# Patient Record
Sex: FEMALE | Race: WHITE | NOT HISPANIC OR LATINO | Employment: UNEMPLOYED | RURAL
[De-identification: names, ages, dates, MRNs, and addresses within clinical notes are randomized per-mention and may not be internally consistent; named-entity substitution may affect disease eponyms.]

---
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
## 2020-02-17 ENCOUNTER — INITIAL PRENATAL (OUTPATIENT)
Dept: OBSTETRICS AND GYNECOLOGY | Facility: CLINIC | Age: 20
End: 2020-02-17

## 2020-02-17 ENCOUNTER — APPOINTMENT (OUTPATIENT)
Dept: LAB | Facility: HOSPITAL | Age: 20
End: 2020-02-17

## 2020-02-17 VITALS — WEIGHT: 109 LBS | BODY MASS INDEX: 21.29 KG/M2 | DIASTOLIC BLOOD PRESSURE: 62 MMHG | SYSTOLIC BLOOD PRESSURE: 110 MMHG

## 2020-02-17 DIAGNOSIS — O34.219 HISTORY OF CESAREAN DELIVERY, CURRENTLY PREGNANT: ICD-10-CM

## 2020-02-17 DIAGNOSIS — Z32.00 PREGNANCY EXAMINATION OR TEST, PREGNANCY UNCONFIRMED: ICD-10-CM

## 2020-02-17 DIAGNOSIS — Z64.1 MULTIGRAVIDA: Primary | ICD-10-CM

## 2020-02-17 DIAGNOSIS — Z34.80 SUPERVISION OF OTHER NORMAL PREGNANCY: ICD-10-CM

## 2020-02-17 DIAGNOSIS — Z12.4 ENCOUNTER FOR PAPANICOLAOU SMEAR FOR CERVICAL CANCER SCREENING: ICD-10-CM

## 2020-02-17 DIAGNOSIS — Z34.90 PREGNANCY WITH FETUS OF UNKNOWN GESTATIONAL AGE: ICD-10-CM

## 2020-02-17 DIAGNOSIS — Z28.21 INFLUENZA VACCINE REFUSED: ICD-10-CM

## 2020-02-17 LAB
B-HCG UR QL: POSITIVE
INTERNAL NEGATIVE CONTROL: NEGATIVE
INTERNAL POSITIVE CONTROL: POSITIVE
Lab: ABNORMAL

## 2020-02-17 PROCEDURE — 80081 OBSTETRIC PANEL INC HIV TSTG: CPT | Performed by: NURSE PRACTITIONER

## 2020-02-17 PROCEDURE — 99214 OFFICE O/P EST MOD 30 MIN: CPT | Performed by: NURSE PRACTITIONER

## 2020-02-17 PROCEDURE — 80306 DRUG TEST PRSMV INSTRMNT: CPT | Performed by: NURSE PRACTITIONER

## 2020-02-17 PROCEDURE — 87591 N.GONORRHOEAE DNA AMP PROB: CPT | Performed by: NURSE PRACTITIONER

## 2020-02-17 PROCEDURE — 87661 TRICHOMONAS VAGINALIS AMPLIF: CPT | Performed by: NURSE PRACTITIONER

## 2020-02-17 PROCEDURE — 81001 URINALYSIS AUTO W/SCOPE: CPT | Performed by: NURSE PRACTITIONER

## 2020-02-17 PROCEDURE — 87147 CULTURE TYPE IMMUNOLOGIC: CPT | Performed by: NURSE PRACTITIONER

## 2020-02-17 PROCEDURE — 87624 HPV HI-RISK TYP POOLED RSLT: CPT | Performed by: NURSE PRACTITIONER

## 2020-02-17 PROCEDURE — 88141 CYTOPATH C/V INTERPRET: CPT | Performed by: PATHOLOGY

## 2020-02-17 PROCEDURE — 87088 URINE BACTERIA CULTURE: CPT | Performed by: NURSE PRACTITIONER

## 2020-02-17 PROCEDURE — 87491 CHLMYD TRACH DNA AMP PROBE: CPT | Performed by: NURSE PRACTITIONER

## 2020-02-17 PROCEDURE — 86803 HEPATITIS C AB TEST: CPT | Performed by: NURSE PRACTITIONER

## 2020-02-17 PROCEDURE — 81025 URINE PREGNANCY TEST: CPT | Performed by: NURSE PRACTITIONER

## 2020-02-17 PROCEDURE — G0123 SCREEN CERV/VAG THIN LAYER: HCPCS | Performed by: NURSE PRACTITIONER

## 2020-02-17 PROCEDURE — 87086 URINE CULTURE/COLONY COUNT: CPT | Performed by: NURSE PRACTITIONER

## 2020-02-17 RX ORDER — PRENATAL VIT NO.126/IRON/FOLIC 28MG-0.8MG
TABLET ORAL DAILY
COMMUNITY
End: 2020-09-17

## 2020-02-17 NOTE — PROGRESS NOTES
[facility]  Obstetrics Visit    CHIEF COMPLAINT:  New prenatal visit    HISTORY OF PRESENT ILLNESS:  Carol Guzmán is a 20 y.o. y/o  at Unknown by LMP (No LMP recorded (lmp unknown). Patient is pregnant.). Reports her periods have been different in the past 4 months, has had random spotting. This was a unplanned pregnancy and the patient is supported by Nasim JONES. Reports nausea, no vomiting. Reports breast tenderness. She denies any vaginal bleeding. She has started taking a prenatal vitamin.    REVIEW OF SYSTEMS  Review of Systems   Constitutional: Negative for chills, fatigue, fever, unexpected weight gain and unexpected weight loss.   HENT: Negative for sneezing and sore throat.    Respiratory: Negative for shortness of breath.    Cardiovascular: Negative for chest pain and palpitations.   Gastrointestinal: Positive for nausea. Negative for abdominal pain, constipation and diarrhea.   Genitourinary: Negative for breast discharge, breast lump, breast pain, difficulty urinating, dysuria, frequency, pelvic pain, pelvic pressure, urinary incontinence, vaginal bleeding, vaginal discharge and vaginal pain.   Skin: Negative for rash.   Neurological: Negative for weakness and headache.   Psychiatric/Behavioral: Negative for sleep disturbance, depressed mood and stress.       PRENATAL RISK FACTORS  Pregnancy Problems (from 19 to present)     Problem Noted Resolved    Multigravida 2020 by Fransisca Francois APRN No    History of  delivery, currently pregnant 2020 by Fransisca Francois APRN No    Overview Signed 2020  4:20 PM by Fransisca Francois APRN     Desires a repeat C/S         Influenza vaccine refused 2020 by Fransisca Francois APRN No          DATING CRITERIA:  LMP (unknown) -- MIKE  1TUS ( at ) -- MIKE-pending    OBSTETRIC HISTORY:  OB History    Para Term  AB Living   2 1 1     1   SAB TAB Ectopic Molar Multiple Live Births            0 1      # Outcome Date GA Lbr Patricio/2nd Weight Sex Delivery Anes PTL Lv   2 Current            1 Term 17 40w0d  2760 g (6 lb 1.4 oz) F CS-LTranv EPI N FRAN      Complications: Fetal Intolerance     GYN HISTORY:  Denies h/o sexually transmitted infections/pelvic inflammatory disease  Denies h/o abnormal pap smears  Last pap smear:    Last Completed Pap Smear       Status Date      PAP SMEAR No completions recorded        Denies h/o gynecologic surgeries, including biopsies of the cervix    PAST MEDICAL HISTORY:  Past Medical History:   Diagnosis Date   • Acute pharyngitis, unspecified    • Allergic    • Allergic rhinitis    • Asthma    • Common cold    • Cough    • Heart murmur    • History of ear infections    • Otitis media     Serous     • Upper respiratory infection    • Urinary tract infection      PAST SURGICAL HISTORY:  Past Surgical History:   Procedure Laterality Date   •  SECTION N/A 2017    Procedure:  SECTION PRIMARY;  Surgeon: Bird Munson MD;  Location: [facility] LABOR DELIVERY;  Service:      FAMILY HISTORY:  Family History   Problem Relation Age of Onset   • No Known Problems Father    • No Known Problems Mother    • No Known Problems Sister      SOCIAL HISTORY:  Social History     Socioeconomic History   • Marital status: Single     Spouse name: Not on file   • Number of children: Not on file   • Years of education: Not on file   • Highest education level: Not on file   Tobacco Use   • Smoking status: Never Smoker   • Smokeless tobacco: Never Used   Substance and Sexual Activity   • Alcohol use: No   • Drug use: Not Currently     Types: Marijuana   • Sexual activity: Yes     Partners: Male     Birth control/protection: None     GENETIC SCREENING:  Age >34 yo as of MIKE: No  Thalassemia: No  NTD: No  CHD: No  Down Syndrome/MR/Fragile X/Autism: No  Ashkenazi Mu-ism with Tha-Sachs, [place], familial dysautonomia: No  Sickle cell disease or trait: No  Hemophilia:  No  Muscular dystrophy: No  Cystic fibrosis: No  Elma's chorea: No  Birth defects: No  Genetic/chromosomal disorders: No    INFECTION HISTORY:  TB exposure: No  HSV: No  Illness since LMP: No  Prior GBS infected child: No  STIs: No    ALLERGIES:  Allergies   Allergen Reactions   • Percocet [Oxycodone-Acetaminophen] Hives     MEDICATIONS:  Prior to Admission medications    Medication Sig Start Date End Date Taking? Authorizing Provider   Prenatal Vit-Fe Fumarate-FA (PRENATAL, CLASSIC, VITAMIN) 28-0.8 MG tablet tablet Take  by mouth Daily.   Yes Provider, Historical, MD     PHYSICAL EXAM:   /62   Wt 49.4 kg (109 lb)   LMP  (LMP Unknown)   BMI 21.29 kg/m²   General: Alert, healthy, no distress, well nourished and well developed.  Neurologic: Alert, oriented to person, place, and time.  Gait normal.  Cranial nerves II-XII grossly intact.  HEENT: Normocephalic, atraumatic.  Extraocular muscles intact, pupils equal and reactive x2.    Teeth: Normal hygiene.  Neck: Supple, no adenopathy, thyroid normal size, non-tender, without nodularity, trachea midline.  Breasts: Deferred  Lungs: Normal respiratory effort.  Clear to auscultation bilaterally.  No wheezes, rhonci, or rales.  Heart: Regular rate and rhythm.  No murmer, rub or gallop.  Abdomen: Soft, non-tender, non-distended,no masses, no hepatosplenomegaly, no hernia.  Skin: No rash, no lesions.  Extremities: No cyanosis, clubbing or edema.  PELVIC EXAM:  External Genitalia/Vulva: Anatomy is normal, no significant redness of labia, no discharge on vulvar tissues, [place]'s and Bartholin's glands are normal, no ulcers, no condylomatous lesions.  Urethra: Normal, no lesions.  Vagina: Vaginal tissues are not inflamed, normal color and texture, no significant discharge present.  Cervix: Normal in appearance without lesions or purulent discharge, no cervical motion tenderness.  Uterus: Normal size, shape, and consistency.  Adnexa: Normal size and shape  bilaterally, no palpable mass bilaterally and non-tender bilaterally.  Pap smear obtained.    Bedside ultrasound performed by myself which shows the findings below: single intrauterine pregnancy, +cardiac movement via Vscan, FHR 170s via doppler      IMPRESSION:  Carol Guzmán is a 20 y.o.  at Unknown for a new prenatal visit.    PLAN:  1.  IUP at unknown EGA  - Options counseling performed and patient desires continuation of pregnancy to term   - Prenatal labs ordered  - Genetic testing including cystic fibrosis was discussed and patient desires NIPS  - Continue prenatal vitamins  - Weight gain counseling performed.   - BMI 18.5-24.9: 25-35 lb  - Return to clinic in 2 weeks for Dating U/S and  return prenatal visit     Diagnosis Plan   1. Multigravida     2. Pregnancy examination or test, pregnancy unconfirmed  POC Pregnancy, Urine   3. Supervision of other normal pregnancy  Chlamydia trachomatis, Neisseria gonorrhoeae, Trichomonas vaginalis, PCR - Urine, Urine, Clean Catch    OB Panel With HIV    Urine Culture - Urine, Urine, Clean Catch    Urine Drug Screen - Urine, Clean Catch    Urinalysis With Culture If Indicated -    RPR    CBC Auto Differential    Hepatitis B Surface Antigen    Rubella Antibody, IgG    OB Panel Type & Screen    HIV-1 / O / 2 Ag / Antibody 4th Generation    Hepatitis C Antibody    Hep B Confirmation Tube   4. Encounter for Papanicolaou smear for cervical cancer screening  Liquid-based Pap Smear, Screening   5. Pregnancy with fetus of unknown gestational age  US Ob < 14 Weeks Single or First Gestation   6. History of  delivery, currently pregnant     7. Influenza vaccine refused       Fransisca Connolly, APRN  2020  4:27 PM

## 2020-02-18 LAB
ABO GROUP BLD: NORMAL
AMPHET+METHAMPHET UR QL: NEGATIVE
AMPHETAMINES UR QL: NEGATIVE
BACTERIA UR QL AUTO: ABNORMAL /HPF
BARBITURATES UR QL SCN: NEGATIVE
BASOPHILS # BLD AUTO: 0.04 10*3/MM3 (ref 0–0.2)
BASOPHILS NFR BLD AUTO: 0.5 % (ref 0–1.5)
BENZODIAZ UR QL SCN: NEGATIVE
BILIRUB UR QL STRIP: NEGATIVE
BLD GP AB SCN SERPL QL: NEGATIVE
BUPRENORPHINE SERPL-MCNC: NEGATIVE NG/ML
CANNABINOIDS SERPL QL: POSITIVE
CLARITY UR: CLEAR
COCAINE UR QL: NEGATIVE
COLOR UR: YELLOW
DEPRECATED RDW RBC AUTO: 40.1 FL (ref 37–54)
EOSINOPHIL # BLD AUTO: 0.2 10*3/MM3 (ref 0–0.4)
EOSINOPHIL NFR BLD AUTO: 2.3 % (ref 0.3–6.2)
ERYTHROCYTE [DISTWIDTH] IN BLOOD BY AUTOMATED COUNT: 12.2 % (ref 12.3–15.4)
GLUCOSE UR STRIP-MCNC: NEGATIVE MG/DL
HBV SURFACE AG SERPL QL IA: NORMAL
HCT VFR BLD AUTO: 31.2 % (ref 34–46.6)
HCV AB SER DONR QL: NORMAL
HGB BLD-MCNC: 10.5 G/DL (ref 12–15.9)
HGB UR QL STRIP.AUTO: NEGATIVE
HIV1+2 AB SER QL: NORMAL
HOLD SPECIMEN: NORMAL
HYALINE CASTS UR QL AUTO: ABNORMAL /LPF
IMM GRANULOCYTES # BLD AUTO: 0.03 10*3/MM3 (ref 0–0.05)
IMM GRANULOCYTES NFR BLD AUTO: 0.3 % (ref 0–0.5)
KETONES UR QL STRIP: NEGATIVE
LEUKOCYTE ESTERASE UR QL STRIP.AUTO: ABNORMAL
LYMPHOCYTES # BLD AUTO: 1.91 10*3/MM3 (ref 0.7–3.1)
LYMPHOCYTES NFR BLD AUTO: 21.7 % (ref 19.6–45.3)
Lab: NORMAL
MCH RBC QN AUTO: 30.5 PG (ref 26.6–33)
MCHC RBC AUTO-ENTMCNC: 33.7 G/DL (ref 31.5–35.7)
MCV RBC AUTO: 90.7 FL (ref 79–97)
METHADONE UR QL SCN: NEGATIVE
MONOCYTES # BLD AUTO: 0.48 10*3/MM3 (ref 0.1–0.9)
MONOCYTES NFR BLD AUTO: 5.5 % (ref 5–12)
NEUTROPHILS # BLD AUTO: 6.13 10*3/MM3 (ref 1.7–7)
NEUTROPHILS NFR BLD AUTO: 69.7 % (ref 42.7–76)
NITRITE UR QL STRIP: NEGATIVE
NRBC BLD AUTO-RTO: 0 /100 WBC (ref 0–0.2)
OPIATES UR QL: NEGATIVE
OXYCODONE UR QL SCN: NEGATIVE
PCP UR QL SCN: NEGATIVE
PH UR STRIP.AUTO: 6.5 [PH] (ref 5–8)
PLATELET # BLD AUTO: 283 10*3/MM3 (ref 140–450)
PMV BLD AUTO: 10.3 FL (ref 6–12)
PROPOXYPH UR QL: NEGATIVE
PROT UR QL STRIP: ABNORMAL
RBC # BLD AUTO: 3.44 10*6/MM3 (ref 3.77–5.28)
RBC # UR: ABNORMAL /HPF
REF LAB TEST METHOD: ABNORMAL
RH BLD: POSITIVE
RPR SER QL: NORMAL
RUBV IGG SERPL IA-ACNC: POSITIVE
SP GR UR STRIP: 1.01 (ref 1–1.03)
SQUAMOUS #/AREA URNS HPF: ABNORMAL /HPF
TRICYCLICS UR QL SCN: NEGATIVE
UROBILINOGEN UR QL STRIP: ABNORMAL
WBC NRBC COR # BLD: 8.79 10*3/MM3 (ref 3.4–10.8)
WBC UR QL AUTO: ABNORMAL /HPF

## 2020-02-19 LAB
BACTERIA SPEC AEROBE CULT: ABNORMAL
C TRACH RRNA CVX QL NAA+PROBE: NEGATIVE
N GONORRHOEA RRNA SPEC QL NAA+PROBE: NEGATIVE
STREP GROUPING: ABNORMAL
STREP GROUPING: ABNORMAL
TRICHOMONAS VAGINALIS PCR: NEGATIVE

## 2020-02-20 RX ORDER — FERROUS SULFATE 325(65) MG
325 TABLET ORAL EVERY OTHER DAY
Qty: 30 TABLET | Refills: 0 | Status: SHIPPED | OUTPATIENT
Start: 2020-02-20 | End: 2020-05-28 | Stop reason: SDUPTHER

## 2020-02-21 LAB
GEN CATEG CVX/VAG CYTO-IMP: ABNORMAL
LAB AP CASE REPORT: ABNORMAL
LAB AP GYN ADDITIONAL INFORMATION: ABNORMAL
LAB AP GYN OTHER FINDINGS: ABNORMAL
PATH INTERP SPEC-IMP: ABNORMAL
STAT OF ADQ CVX/VAG CYTO-IMP: ABNORMAL

## 2020-02-24 LAB — HPV I/H RISK 4 DNA CVX QL PROBE+SIG AMP: POSITIVE

## 2020-03-02 ENCOUNTER — ROUTINE PRENATAL (OUTPATIENT)
Dept: OBSTETRICS AND GYNECOLOGY | Facility: CLINIC | Age: 20
End: 2020-03-02

## 2020-03-02 VITALS — WEIGHT: 108 LBS | SYSTOLIC BLOOD PRESSURE: 92 MMHG | DIASTOLIC BLOOD PRESSURE: 60 MMHG | BODY MASS INDEX: 21.09 KG/M2

## 2020-03-02 DIAGNOSIS — R87.810 ASCUS WITH POSITIVE HIGH RISK HPV CERVICAL: ICD-10-CM

## 2020-03-02 DIAGNOSIS — Z3A.12 12 WEEKS GESTATION OF PREGNANCY: ICD-10-CM

## 2020-03-02 DIAGNOSIS — R82.5 POSITIVE URINE DRUG SCREEN: ICD-10-CM

## 2020-03-02 DIAGNOSIS — R87.610 ASCUS WITH POSITIVE HIGH RISK HPV CERVICAL: ICD-10-CM

## 2020-03-02 DIAGNOSIS — O99.012 ANEMIA IN PREGNANCY, SECOND TRIMESTER: ICD-10-CM

## 2020-03-02 DIAGNOSIS — O99.322 DRUG USE AFFECTING PREGNANCY IN SECOND TRIMESTER: Primary | ICD-10-CM

## 2020-03-02 PROCEDURE — 99213 OFFICE O/P EST LOW 20 MIN: CPT | Performed by: NURSE PRACTITIONER

## 2020-03-02 NOTE — PROGRESS NOTES
CC: Prenatal visit    Carol Guzmán is a 20 y.o.  at 12w0d.  Doing well.  No complaints.  Denies contractions, LOF, or VB.  Reports good FM. Reviewed prenatal labs; pt is anemic. Reports she has been taking her iron every other day. +UDS, she reports she takes THC gummies to help with her appetite.     BP 92/60   Wt 49 kg (108 lb)   LMP  (LMP Unknown)   BMI 21.09 kg/m²   SVE: Deferred     Fetal Heart Rate: 163 us    Pregnancy Problems (from 20 to present)     Problem Noted Resolved    Positive urine drug screen 3/2/2020 by Fransisca Francois APRN No    Overview Signed 3/2/2020  2:13 PM by Fransisca Francois APRN     +THC         ASCUS with positive high risk HPV cervical 3/2/2020 by Fransisca Francois APRN No    Overview Signed 3/2/2020  3:28 PM by Fransisca Francois APRN     Repeat in 12 months.          Anemia in pregnancy, second trimester 3/2/2020 by Fransisca Francois APRN No    Overview Signed 3/2/2020  3:32 PM by Fransisca Francois APRN     Ferrous sulfate supplementation         Multigravida 2020 by Fransisca Francois APRN No    Overview Signed 3/2/2020  2:19 PM by Fransisca Francois APRN      A pos / Rubella Immune / GBS @ 35-36 wks  Dating: by CRL  Flu: Declined  Genetics: Declined  Anatomy: 20 weeks  Glucola: 28 wks  Tdap: 28 wks  Lab Results   Component Value Date    WBC 8.79 2020    HGB 10.5 (L) 2020    HCT 31.2 (L) 2020    MCV 90.7 2020     2020              History of  delivery, currently pregnant 2020 by Fransisca Francois APRN No    Overview Signed 2020  4:20 PM by Fransisca Francois APRN     Desires a repeat C/S         Influenza vaccine refused 2020 by Fransisca Francois APRN No          A/P: Carol Guzmán is a 20 y.o.  at 12w0d by CRL. Dating US today demonstrates a single intrauterine pregnancy,  bpm with an MIKE 2020.   - Inform pt of PAP result at next apt.  - RTC in 4  weeks     Diagnosis Plan   1. Drug use affecting pregnancy in second trimester  Counseled on cessation of THC gummies   2. Positive urine drug screen     3. 12 weeks gestation of pregnancy     4. ASCUS with positive high risk HPV cervical     5. Anemia in pregnancy, second trimester       Fransisca Connolly, APRN  3/2/2020  3:35 PM

## 2020-03-11 DIAGNOSIS — Z34.90 PREGNANCY WITH FETUS OF UNKNOWN GESTATIONAL AGE: ICD-10-CM

## 2020-03-30 ENCOUNTER — ROUTINE PRENATAL (OUTPATIENT)
Dept: OBSTETRICS AND GYNECOLOGY | Facility: CLINIC | Age: 20
End: 2020-03-30

## 2020-03-30 VITALS — BODY MASS INDEX: 21.09 KG/M2 | DIASTOLIC BLOOD PRESSURE: 58 MMHG | SYSTOLIC BLOOD PRESSURE: 98 MMHG | WEIGHT: 108 LBS

## 2020-03-30 DIAGNOSIS — Z3A.16 16 WEEKS GESTATION OF PREGNANCY: ICD-10-CM

## 2020-03-30 DIAGNOSIS — O99.322 DRUG USE AFFECTING PREGNANCY IN SECOND TRIMESTER: Primary | ICD-10-CM

## 2020-03-30 DIAGNOSIS — F12.11 MILD TETRAHYDROCANNABINOL (THC) ABUSE IN EARLY REMISSION: ICD-10-CM

## 2020-03-30 DIAGNOSIS — O34.211 ENCOUNTER FOR MATERNAL CARE FOR LOW TRANSVERSE SCAR FROM PREVIOUS CESAREAN DELIVERY: ICD-10-CM

## 2020-03-30 DIAGNOSIS — O99.012 ANEMIA IN PREGNANCY, SECOND TRIMESTER: ICD-10-CM

## 2020-03-30 DIAGNOSIS — Z36.3 ENCOUNTER FOR ANTENATAL SCREENING FOR MALFORMATION USING ULTRASOUND: ICD-10-CM

## 2020-03-30 DIAGNOSIS — Z34.82 SUPERVISION OF NORMAL INTRAUTERINE PREGNANCY IN MULTIGRAVIDA IN SECOND TRIMESTER: ICD-10-CM

## 2020-03-30 PROCEDURE — 99214 OFFICE O/P EST MOD 30 MIN: CPT | Performed by: NURSE PRACTITIONER

## 2020-03-30 NOTE — PROGRESS NOTES
CC: Prenatal visit; hx reviewed and updated.     Carol Guzmán is a 20 y.o.  at 16w0d.  Doing well.  No complaints.  Denies contractions, LOF, or VB.  Denies dysuria, abnormal vaginal d/c, headaches, heartburn or constipation. States that she is no longer using THC gummies and her N/V has resolved.    BP 98/58   Wt 49 kg (108 lb)   LMP  (LMP Unknown)   BMI 21.09 kg/m²   SVE: NA      Fetal Heart Rate: 160    Pregnancy Problems (from 20 to present)     Problem Noted Resolved    Drug use affecting pregnancy in second trimester 3/2/2020 by Fransisca Francois APRN No    Overview Signed 3/2/2020  2:13 PM by Fransisca Francois APRN     +THC         Anemia in pregnancy, second trimester 3/2/2020 by Fransisca Francois APRN No    Overview Signed 3/2/2020  3:32 PM by Fransisca Francois APRN     Ferrous sulfate supplementation         Supervision of normal intrauterine pregnancy in multigravida in second trimester 2020 by Fransisca Francois APRN No    Overview Signed 3/2/2020  2:19 PM by Fransisca Francois APRN      A pos / Rubella Immune / GBS @ 35-36 wks  Dating: by CRL  Flu: Declined  Genetics: Declined  Anatomy: 20 weeks  Glucola: 28 wks  Tdap: 28 wks  Lab Results   Component Value Date    WBC 8.79 2020    HGB 10.5 (L) 2020    HCT 31.2 (L) 2020    MCV 90.7 2020     2020              Encounter for maternal care for low transverse scar from previous  delivery 2020 by Fransisca Francois APRN No    Overview Addendum 3/30/2020  2:21 PM by Juliane Mcrae APRN     x1 for fetal intolerance  Desires a repeat C/S               A/P: Carol Guzmán is a 20 y.o.  at 16w0d.  - RTC in 4 weeks for anatomy scan and ILANA visit     Diagnosis Plan   1. Drug use affecting pregnancy in second trimester  Reports cessation of THC  Will need UDS next visit at time of anatomy scan   2. Anemia in pregnancy, second trimester  US Ob 14 + Weeks Single or  First Gestation   3. Supervision of normal intrauterine pregnancy in multigravida in second trimester     4. Encounter for  screening for malformation using ultrasound  US Ob 14 + Weeks Single or First Gestation   5. Mild tetrahydrocannabinol (THC) abuse in early remission     6. Encounter for maternal care for low transverse scar from previous  delivery     7. 16 weeks gestation of pregnancy       Juliane Mcrae, DAX  3/30/2020  14:22

## 2020-04-22 DIAGNOSIS — Z36.3 ENCOUNTER FOR ANTENATAL SCREENING FOR MALFORMATION USING ULTRASOUND: ICD-10-CM

## 2020-04-22 DIAGNOSIS — O99.012 ANEMIA IN PREGNANCY, SECOND TRIMESTER: ICD-10-CM

## 2020-04-29 ENCOUNTER — ROUTINE PRENATAL (OUTPATIENT)
Dept: OBSTETRICS AND GYNECOLOGY | Facility: CLINIC | Age: 20
End: 2020-04-29

## 2020-04-29 VITALS — WEIGHT: 119 LBS | SYSTOLIC BLOOD PRESSURE: 120 MMHG | BODY MASS INDEX: 23.24 KG/M2 | DIASTOLIC BLOOD PRESSURE: 52 MMHG

## 2020-04-29 DIAGNOSIS — Z36.89 ENCOUNTER FOR OTHER SPECIFIED ANTENATAL SCREENING: ICD-10-CM

## 2020-04-29 DIAGNOSIS — O34.211 ENCOUNTER FOR MATERNAL CARE FOR LOW TRANSVERSE SCAR FROM PREVIOUS CESAREAN DELIVERY: ICD-10-CM

## 2020-04-29 DIAGNOSIS — Z3A.20 20 WEEKS GESTATION OF PREGNANCY: ICD-10-CM

## 2020-04-29 DIAGNOSIS — Z34.82 SUPERVISION OF NORMAL INTRAUTERINE PREGNANCY IN MULTIGRAVIDA IN SECOND TRIMESTER: ICD-10-CM

## 2020-04-29 DIAGNOSIS — O99.322 DRUG USE AFFECTING PREGNANCY IN SECOND TRIMESTER: Primary | ICD-10-CM

## 2020-04-29 DIAGNOSIS — O99.012 ANEMIA IN PREGNANCY, SECOND TRIMESTER: ICD-10-CM

## 2020-04-29 PROCEDURE — 99213 OFFICE O/P EST LOW 20 MIN: CPT | Performed by: NURSE PRACTITIONER

## 2020-04-29 NOTE — PROGRESS NOTES
CC: Prenatal visit; hx reviewed, no changes.     Carol Guzmán is a 20 y.o.  at 20w2d.  Doing well.  Denies dysuria, vb, LOF, abnormal vaginal d/c, headaches, heartburn or constipation.     /52   Wt 54 kg (119 lb)   LMP  (LMP Unknown)   BMI 23.24 kg/m²   SVE: NA    Anatomy u/s reviewed. All anatomy seen and noted to appear normal. U/S done on . Anterior placenta w/o previa, with normal insertion of a 3VC. CL- WNL.  Fundal Height (cm): 20 cm  Fetal Heart Rate: 140    Pregnancy Problems (from 20 to present)     Problem Noted Resolved    Drug use affecting pregnancy in second trimester 3/2/2020 by Fransisca Francois APRN No    Overview Signed 3/2/2020  2:13 PM by Fransisca Francois APRN     +THC         Anemia in pregnancy, second trimester 3/2/2020 by Fransisca Francois APRN No    Overview Signed 3/2/2020  3:32 PM by Fransisca Francois APRN     Ferrous sulfate supplementation         Supervision of normal intrauterine pregnancy in multigravida in second trimester 2020 by Fransisca Francois APRN No    Overview Signed 3/2/2020  2:19 PM by Fransisca Francois APRN      A pos / Rubella Immune / GBS @ 35-36 wks  Dating: by CRL  Flu: Declined  Genetics: Declined  Anatomy: 20 weeks  Glucola: 28 wks  Tdap: 28 wks  Lab Results   Component Value Date    WBC 8.79 2020    HGB 10.5 (L) 2020    HCT 31.2 (L) 2020    MCV 90.7 2020     2020              Encounter for maternal care for low transverse scar from previous  delivery 2020 by Fransisca Francois APRN No    Overview Addendum 3/30/2020  2:21 PM by Juliane Mcrae APRN     x1 for fetal intolerance  Desires a repeat C/S               A/P: Carol Guzmán is a 20 y.o.  at 20w2d.  - RTC in 4 weeks  - Reviewed COVID-19 visitation policy  - Reviewed COVID-19 precautions     Diagnosis Plan   1. Encounter for other specified  screening  CBC (No Diff)    Glucose, Post 50 Gm  Glucola    Urine Drug Screen - Urine, Clean Catch   2. Drug use affecting pregnancy in second trimester  Urine Drug Screen - Urine, Clean Catch   3. Anemia in pregnancy, second trimester     4. Supervision of normal intrauterine pregnancy in multigravida in second trimester     5. Encounter for maternal care for low transverse scar from previous  delivery       Juliane Mcrae, DAX  2020  16:12

## 2020-05-28 ENCOUNTER — APPOINTMENT (OUTPATIENT)
Dept: LAB | Facility: HOSPITAL | Age: 20
End: 2020-05-28

## 2020-05-28 ENCOUNTER — LAB (OUTPATIENT)
Dept: LAB | Facility: HOSPITAL | Age: 20
End: 2020-05-28

## 2020-05-28 ENCOUNTER — ROUTINE PRENATAL (OUTPATIENT)
Dept: OBSTETRICS AND GYNECOLOGY | Facility: CLINIC | Age: 20
End: 2020-05-28

## 2020-05-28 VITALS — BODY MASS INDEX: 25.39 KG/M2 | SYSTOLIC BLOOD PRESSURE: 112 MMHG | WEIGHT: 130 LBS | DIASTOLIC BLOOD PRESSURE: 58 MMHG

## 2020-05-28 DIAGNOSIS — O99.012 ANEMIA IN PREGNANCY, SECOND TRIMESTER: ICD-10-CM

## 2020-05-28 DIAGNOSIS — O99.322 DRUG USE AFFECTING PREGNANCY IN SECOND TRIMESTER: Primary | ICD-10-CM

## 2020-05-28 DIAGNOSIS — O99.322 DRUG USE AFFECTING PREGNANCY IN SECOND TRIMESTER: ICD-10-CM

## 2020-05-28 DIAGNOSIS — O23.42 URINARY TRACT INFECTION IN MOTHER DURING SECOND TRIMESTER OF PREGNANCY: ICD-10-CM

## 2020-05-28 DIAGNOSIS — N13.30 HYDRONEPHROSIS DETERMINED BY ULTRASOUND: ICD-10-CM

## 2020-05-28 DIAGNOSIS — Z3A.24 24 WEEKS GESTATION OF PREGNANCY: ICD-10-CM

## 2020-05-28 DIAGNOSIS — Z34.82 SUPERVISION OF NORMAL INTRAUTERINE PREGNANCY IN MULTIGRAVIDA IN SECOND TRIMESTER: ICD-10-CM

## 2020-05-28 DIAGNOSIS — O34.211 ENCOUNTER FOR MATERNAL CARE FOR LOW TRANSVERSE SCAR FROM PREVIOUS CESAREAN DELIVERY: ICD-10-CM

## 2020-05-28 DIAGNOSIS — Z36.89 ENCOUNTER FOR OTHER SPECIFIED ANTENATAL SCREENING: ICD-10-CM

## 2020-05-28 LAB
AMPHET+METHAMPHET UR QL: NEGATIVE
AMPHETAMINES UR QL: NEGATIVE
BARBITURATES UR QL SCN: NEGATIVE
BENZODIAZ UR QL SCN: NEGATIVE
BUPRENORPHINE SERPL-MCNC: NEGATIVE NG/ML
CANNABINOIDS SERPL QL: NEGATIVE
COCAINE UR QL: NEGATIVE
DEPRECATED RDW RBC AUTO: 42.2 FL (ref 37–54)
ERYTHROCYTE [DISTWIDTH] IN BLOOD BY AUTOMATED COUNT: 12.8 % (ref 12.3–15.4)
GLUCOSE 1H P 100 G GLC PO SERPL-MCNC: 187 MG/DL (ref 60–140)
HCT VFR BLD AUTO: 20.9 % (ref 34–46.6)
HGB BLD-MCNC: 7.2 G/DL (ref 12–15.9)
MCH RBC QN AUTO: 31.7 PG (ref 26.6–33)
MCHC RBC AUTO-ENTMCNC: 34.4 G/DL (ref 31.5–35.7)
MCV RBC AUTO: 92.1 FL (ref 79–97)
METHADONE UR QL SCN: NEGATIVE
OPIATES UR QL: NEGATIVE
OXYCODONE UR QL SCN: NEGATIVE
PCP UR QL SCN: NEGATIVE
PLATELET # BLD AUTO: 198 10*3/MM3 (ref 140–450)
PMV BLD AUTO: 9.3 FL (ref 6–12)
PROPOXYPH UR QL: NEGATIVE
RBC # BLD AUTO: 2.27 10*6/MM3 (ref 3.77–5.28)
TRICYCLICS UR QL SCN: NEGATIVE
WBC NRBC COR # BLD: 7.6 10*3/MM3 (ref 3.4–10.8)

## 2020-05-28 PROCEDURE — 80306 DRUG TEST PRSMV INSTRMNT: CPT

## 2020-05-28 PROCEDURE — 82950 GLUCOSE TEST: CPT

## 2020-05-28 PROCEDURE — 99214 OFFICE O/P EST MOD 30 MIN: CPT | Performed by: NURSE PRACTITIONER

## 2020-05-28 PROCEDURE — 36415 COLL VENOUS BLD VENIPUNCTURE: CPT

## 2020-05-28 PROCEDURE — 85027 COMPLETE CBC AUTOMATED: CPT

## 2020-05-28 RX ORDER — DOCUSATE SODIUM 100 MG/1
100 CAPSULE, LIQUID FILLED ORAL DAILY
COMMUNITY
Start: 2020-05-28 | End: 2020-09-17

## 2020-05-28 RX ORDER — CEPHALEXIN 500 MG/1
500 CAPSULE ORAL 4 TIMES DAILY
COMMUNITY
Start: 2020-05-28 | End: 2020-06-18

## 2020-05-28 RX ORDER — FERROUS SULFATE 325(65) MG
325 TABLET ORAL 2 TIMES DAILY
COMMUNITY
Start: 2020-05-28 | End: 2020-09-17

## 2020-05-28 NOTE — PROGRESS NOTES
CC: Prenatal visit; hx reviewed and updated. Pt was just d/c'd from [facility] L&D this morning. She was being treated for dehydration, hydronephrosis and severe anemia.    Carol Guzmán is a 20 y.o.  at 24w3d. Denies N/V, contractions, LOF, or VB.  Reports good FM. Was having severe flank pain bilaterally 3 nights ago and was instructed by this APRN on-call to go to the ER at [facility]. She instead went to [facility] and was there for the past 3 days getting IV fluids and antibiotics. She notes today that her pain has nearly subsided and she's just feeling fatigued now. Records from [facility] reviewed and sent to medical records.    /58   Wt 59 kg (130 lb)   LMP  (LMP Unknown)   BMI 25.39 kg/m²   SVE: NA  Fundal Height (cm): 25 cm  Fetal Heart Rate: 166    Pregnancy Problems (from 20 to present)     Problem Noted Resolved    Urinary tract infection in mother during second trimester of pregnancy 2020 by Juliane Mcrae APRN No    Overview Signed 2020  3:39 PM by Juliane Mcrae APRN     Needs GOYO in 2 weeks         Hydronephrosis determined by ultrasound 2020 by Juliane Mcrae APRN No    Overview Signed 2020  3:40 PM by Juliane Mcrae APRN     Dx at [facility] on . Pain is improving          Drug use affecting pregnancy in second trimester 3/2/2020 by Fransisca Francois APRN No    Overview Signed 3/2/2020  2:13 PM by Fransisca Francois APRN     +THC         Anemia in pregnancy, second trimester 3/2/2020 by Fransisca Francois APRN No    Overview Signed 3/2/2020  3:32 PM by Fransisca Francois APRN     Ferrous sulfate supplementation         Supervision of normal intrauterine pregnancy in multigravida in second trimester 2020 by Fransisca Francois APRN No    Overview Signed 3/2/2020  2:19 PM by Fransisca Francois APRN      A pos / Rubella Immune / GBS @ 35-36 wks  Dating: by CRL  Flu: Declined  Genetics: Declined  Anatomy: 20 weeks  Glucola: 28 wks  Tdap: 28 wks  Lab Results    Component Value Date    WBC 8.79 2020    HGB 10.5 (L) 2020    HCT 31.2 (L) 2020    MCV 90.7 2020     2020              Encounter for maternal care for low transverse scar from previous  delivery 2020 by Fransisca Francois APRN No    Overview Addendum 3/30/2020  2:21 PM by Juliane Mcrae APRN     x1 for fetal intolerance  Desires a repeat C/S               A/P: Carol Guzmán is a 20 y.o.  at 24w3d.  - RTC in 2 weeks for ILANA visit and will need GOYO for UTI at that time.  - Reviewed COVID-19 visitation policy  - Reviewed COVID-19 precautions     Diagnosis Plan   1. Drug use affecting pregnancy in second trimester  UDS today   2. Anemia in pregnancy, second trimester  CBC today   3. Supervision of normal intrauterine pregnancy in multigravida in second trimester  Glucola today   4. Encounter for maternal care for low transverse scar from previous  delivery     5. Hydronephrosis determined by ultrasound     6. Urinary tract infection in mother during second trimester of pregnancy  Advised to take all antibiotics prescribed and will plan to recheck urinalysis in 2 weeks at f/u   7. 24 weeks gestation of pregnancy       DAX [place]  2020  15:41

## 2020-05-29 ENCOUNTER — TELEPHONE (OUTPATIENT)
Dept: OBSTETRICS AND GYNECOLOGY | Facility: CLINIC | Age: 20
End: 2020-05-29

## 2020-05-29 ENCOUNTER — CONSULT (OUTPATIENT)
Dept: ONCOLOGY | Facility: CLINIC | Age: 20
End: 2020-05-29

## 2020-05-29 ENCOUNTER — INFUSION (OUTPATIENT)
Dept: ONCOLOGY | Facility: HOSPITAL | Age: 20
End: 2020-05-29

## 2020-05-29 ENCOUNTER — LAB (OUTPATIENT)
Dept: ONCOLOGY | Facility: HOSPITAL | Age: 20
End: 2020-05-29

## 2020-05-29 VITALS
BODY MASS INDEX: 24.71 KG/M2 | SYSTOLIC BLOOD PRESSURE: 126 MMHG | HEART RATE: 97 BPM | DIASTOLIC BLOOD PRESSURE: 62 MMHG | WEIGHT: 126.5 LBS | TEMPERATURE: 97.3 F | RESPIRATION RATE: 18 BRPM

## 2020-05-29 DIAGNOSIS — O99.012 ANEMIA DURING PREGNANCY IN SECOND TRIMESTER: Primary | ICD-10-CM

## 2020-05-29 DIAGNOSIS — O99.012 ANEMIA IN PREGNANCY, SECOND TRIMESTER: ICD-10-CM

## 2020-05-29 DIAGNOSIS — O99.012 ANEMIA IN PREGNANCY, SECOND TRIMESTER: Primary | ICD-10-CM

## 2020-05-29 DIAGNOSIS — D50.9 IRON DEFICIENCY ANEMIA, UNSPECIFIED IRON DEFICIENCY ANEMIA TYPE: ICD-10-CM

## 2020-05-29 DIAGNOSIS — E53.8 B12 DEFICIENCY: ICD-10-CM

## 2020-05-29 LAB
FERRITIN SERPL-MCNC: 48.88 NG/ML (ref 13–150)
FOLATE SERPL-MCNC: 12.6 NG/ML (ref 4.78–24.2)
IRON 24H UR-MRATE: 130 MCG/DL (ref 37–145)
IRON SATN MFR SERPL: 38 % (ref 20–50)
PHOSPHATE SERPL-MCNC: 3.5 MG/DL (ref 2.5–4.5)
TIBC SERPL-MCNC: 343 MCG/DL (ref 298–536)
TRANSFERRIN SERPL-MCNC: 230 MG/DL (ref 200–360)
VIT B12 BLD-MCNC: 235 PG/ML (ref 211–946)

## 2020-05-29 PROCEDURE — 25010000002 FERRIC CARBOXYMALTOSE 750 MG/15ML SOLUTION 15 ML VIAL: Performed by: INTERNAL MEDICINE

## 2020-05-29 PROCEDURE — 36415 COLL VENOUS BLD VENIPUNCTURE: CPT | Performed by: INTERNAL MEDICINE

## 2020-05-29 PROCEDURE — 83540 ASSAY OF IRON: CPT

## 2020-05-29 PROCEDURE — 96374 THER/PROPH/DIAG INJ IV PUSH: CPT | Performed by: INTERNAL MEDICINE

## 2020-05-29 PROCEDURE — 84466 ASSAY OF TRANSFERRIN: CPT

## 2020-05-29 PROCEDURE — 82746 ASSAY OF FOLIC ACID SERUM: CPT

## 2020-05-29 PROCEDURE — 99204 OFFICE O/P NEW MOD 45 MIN: CPT | Performed by: INTERNAL MEDICINE

## 2020-05-29 PROCEDURE — 82728 ASSAY OF FERRITIN: CPT

## 2020-05-29 PROCEDURE — 84100 ASSAY OF PHOSPHORUS: CPT

## 2020-05-29 PROCEDURE — 82607 VITAMIN B-12: CPT

## 2020-05-29 RX ORDER — METHYLPREDNISOLONE SODIUM SUCCINATE 125 MG/2ML
125 INJECTION, POWDER, LYOPHILIZED, FOR SOLUTION INTRAMUSCULAR; INTRAVENOUS AS NEEDED
Status: CANCELLED | OUTPATIENT
Start: 2020-06-05

## 2020-05-29 RX ORDER — DIPHENHYDRAMINE HYDROCHLORIDE 50 MG/ML
50 INJECTION INTRAMUSCULAR; INTRAVENOUS AS NEEDED
Status: DISCONTINUED | OUTPATIENT
Start: 2020-05-29 | End: 2020-05-29 | Stop reason: HOSPADM

## 2020-05-29 RX ORDER — SODIUM CHLORIDE 9 MG/ML
250 INJECTION, SOLUTION INTRAVENOUS ONCE
Status: CANCELLED | OUTPATIENT
Start: 2020-06-05

## 2020-05-29 RX ORDER — DIPHENHYDRAMINE HYDROCHLORIDE 50 MG/ML
50 INJECTION INTRAMUSCULAR; INTRAVENOUS AS NEEDED
Status: CANCELLED | OUTPATIENT
Start: 2020-06-05

## 2020-05-29 RX ORDER — SODIUM CHLORIDE 9 MG/ML
250 INJECTION, SOLUTION INTRAVENOUS ONCE
Status: COMPLETED | OUTPATIENT
Start: 2020-05-29 | End: 2020-05-29

## 2020-05-29 RX ORDER — METHYLPREDNISOLONE SODIUM SUCCINATE 125 MG/2ML
125 INJECTION, POWDER, LYOPHILIZED, FOR SOLUTION INTRAMUSCULAR; INTRAVENOUS AS NEEDED
Status: DISCONTINUED | OUTPATIENT
Start: 2020-05-29 | End: 2020-05-29 | Stop reason: HOSPADM

## 2020-05-29 RX ADMIN — SODIUM CHLORIDE 250 ML: 9 INJECTION, SOLUTION INTRAVENOUS at 13:02

## 2020-05-29 RX ADMIN — FERRIC CARBOXYMALTOSE INJECTION 750 MG: 50 INJECTION, SOLUTION INTRAVENOUS at 13:02

## 2020-05-29 NOTE — TELEPHONE ENCOUNTER
Consulted with Dr. Massey. Pt needs STAT referral to hematology. Discussed with pt. States that she is asymptomatic at this time. Spoke to Kristy in Hematology; pt has appt today at 11:45. Pt notified.

## 2020-05-30 RX ORDER — FOLIC ACID 1 MG/1
1 TABLET ORAL DAILY
Qty: 90 TABLET | Refills: 3 | Status: SHIPPED | OUTPATIENT
Start: 2020-05-30 | End: 2020-09-17

## 2020-05-31 PROBLEM — E53.8 B12 DEFICIENCY: Status: ACTIVE | Noted: 2020-05-31

## 2020-05-31 PROBLEM — D50.9 IRON DEFICIENCY ANEMIA: Status: ACTIVE | Noted: 2020-05-31

## 2020-05-31 NOTE — PROGRESS NOTES
Carol Guzmán  [id number]  2000      REASON FOR CONSULTATION:  Anemia in pregnancy   Provide an opinion on any further workup or treatment                             REQUESTING PHYSICIAN:  Juliane Mcrae     RECORDS OBTAINED:  Records of the patients history including those obtained from the referring provider were reviewed and summarized in detail.      History of Present Illness     This is a pleasant 20 year old female who was seen in consultation at the request of Juliane Mcrae for evaluation of severe anemia during pregnancy. Patient denies any prior history of health problems. She was undergoing pre-twyla exam and was noticed to have severe anemia with Hg of 7.2. She reports many symptoms related to severe anemia - fatigue, lethargy, weakness, palpitations. She is not taking oral iron supplements as she is unable to tolerate oral iron. She denies any blood loss. No BRBPR or melena. She did have issue with anemia during her first pregnancy;however, did not receive blood transfusions or IV iron treatment. Denies any family history of hematological disorder. I have been asked with assistance of severe anemia.     Past Medical History:   Diagnosis Date   • Acute pharyngitis, unspecified    • Allergic    • Allergic rhinitis    • Anemia in pregnancy, second trimester 3/2/2020   • Asthma    • Common cold    • Cough    • Heart murmur    • History of ear infections    • Mild tetrahydrocannabinol (THC) abuse in early remission 3/30/2020   • Otitis media     Serous     • Upper respiratory infection    • Urinary tract infection         Past Surgical History:   Procedure Laterality Date   •  SECTION N/A 2017    Procedure:  SECTION PRIMARY;  Surgeon: Bird Munson MD;  Location: [facility] LABOR DELIVERY;  Service:         Current Outpatient Medications on File Prior to Visit   Medication Sig Dispense Refill   • cephalexin (KEFLEX) 500 MG capsule Take 500 mg by mouth 4 (Four)  Times a Day.     • docusate sodium (COLACE) 100 MG capsule Take 100 mg by mouth Daily.     • ferrous sulfate 325 (65 FE) MG tablet Take 325 mg by mouth 2 (Two) Times a Day.     • Prenatal Vit-Fe Fumarate-FA (PRENATAL, CLASSIC, VITAMIN) 28-0.8 MG tablet tablet Take  by mouth Daily.       No current facility-administered medications on file prior to visit.         ALLERGIES:    Allergies   Allergen Reactions   • Percocet [Oxycodone-Acetaminophen] Hives        Social History     Socioeconomic History   • Marital status: Single     Spouse name: Not on file   • Number of children: Not on file   • Years of education: Not on file   • Highest education level: Not on file   Tobacco Use   • Smoking status: Never Smoker   • Smokeless tobacco: Never Used   Substance and Sexual Activity   • Alcohol use: No   • Drug use: Not Currently     Types: Marijuana   • Sexual activity: Yes     Partners: Male     Birth control/protection: None        Family History   Problem Relation Age of Onset   • No Known Problems Father    • No Known Problems Mother    • No Known Problems Sister         Review of Systems       CONSTITUTIONAL: fatigue + weakness + No weight loss, fever, chills.  HEENT: Eyes: No visual loss, blurred vision, double vision or yellow sclerae. Ears, Nose, Throat: No hearing loss, sneezing, congestion, runny nose or sore throat.  SKIN: No rash or itching.  CARDIOVASCULAR: palpitations + No chest pain, chest pressure or chest discomfort. No  edema.  RESPIRATORY: No shortness of breath, cough or sputum.  GASTROINTESTINAL: No anorexia, nausea, vomiting or diarrhea. No abdominal pain or blood.  GENITOURINARY: Negative for urgency, frequency or dysuria.   NEUROLOGICAL: No headache, dizziness, syncope, paralysis, ataxia, numbness or tingling in the extremities. No change in bowel or bladder control.  MUSCULOSKELETAL: No muscle, back pain, joint pain or stiffness.  HEMATOLOGIC: No anemia, bleeding or bruising.  LYMPHATICS: No  enlarged nodes. No history of splenectomy.  PSYCHIATRIC: No history of depression or anxiety.  ENDOCRINOLOGIC: No reports of sweating, cold or heat intolerance. No polyuria or polydipsia.  ALLERGIES: No history of asthma, hives, eczema or rhinitis.      Objective     Vitals:    05/29/20 1124   BP: 126/62   Pulse: 97   Resp: 18   Temp: 97.3 °F (36.3 °C)   Weight: 57.4 kg (126 lb 8 oz)   PainSc: 0-No pain     Current Status 5/29/2020   ECOG score 0       Physical Exam      GENERAL: Alert, awake, oriented.  Well dressed.  Not in apparent distress. Vitals as above.   HEAD: Normocephalic, atraumatic.   EYES: PERRL, EOMI.  vision is grossly intact. Conjunctival pallor +   NECK: Supple, no adenopathy or thyromegaly.   THROAT: Normal oral cavity and pharynx. No inflammation, swelling, exudate, or lesions.  CARDIAC: Normal S1 and S2. No S3, S4 or murmurs. Rhythm is regular.  Extremities are warm and well perfused.   LUNGS: Clear to auscultation and percussion without rales, rhonchi, wheezing or diminished breath sounds.  ABDOMEN: Positive bowel sounds. Soft, pregnant + , nontender. No guarding or rebound. No masses.  BACK:  No bony tenderness.   EXTREMITIES: No significant deformity or joint abnormality.  Peripheral pulses intact. No varicosities.  SKIN: No rash or bruising.  NEUROLOGICAL: Grossly non-focal exam. No focal weakness. Gait: Normal.   PSYCH: Mood and affect normal. No hallucination or suicidal thoughts.   LYMPHATIC: No cervical, supraclavicular or axillary adenopathy.       RECENT LABS:Independently reviewed and summarized.   Hematology WBC   Date Value Ref Range Status   05/28/2020 7.60 3.40 - 10.80 10*3/mm3 Final     RBC   Date Value Ref Range Status   05/28/2020 2.27 (L) 3.77 - 5.28 10*6/mm3 Final     Hemoglobin   Date Value Ref Range Status   05/28/2020 7.2 (L) 12.0 - 15.9 g/dL Final     Hematocrit   Date Value Ref Range Status   05/28/2020 20.9 (C) 34.0 - 46.6 % Final     Platelets   Date Value Ref Range  Status   05/28/2020 198 140 - 450 10*3/mm3 Final      I have reviewed old records and summarized them in HPI as well as assessment and plan section of this note.       Carol Guzmán reports a pain score of 0.  Given her pain assessment as noted, treatment options were discussed and the following options were decided upon as a follow-up plan to address the patient's pain: continuation of current treatment plan for pain.       Administer PHQ and document follow-up plan if positive   Total Score: 0   Depression screen negative.     Diagnosis:   (1) Anemia in pregnancy, second trimester   (2) Iron deficiency anemia   (3) B12 deficiency     All are new diagnosis/problems for me.     Assessment/Plan     Patient with severe anemia during second trimester of pregnancy.   We checked her iron level, b12 and folate level.   Her anemia is likely multifactorial - dilutional, iron deficiency and b12 deficiency.   She is unable to tolerate oral iron.   Due to this reason I believe she could benefit from IV iron.     I had an extensive discussion with patient about diagnosis and treatment options. I recommend that we replaced her iron with IV injectofer 750 mg, 2 infusions, one week apart. I also recommend that we should check  iron studies every 3 months after this initial replacement and consider IV iron treatment for ferritin drops to less than 50 or transference saturation dropped to less than 20%.     I had an extensive discussion with her about risk versus benefits of IV iron treatment.    I discussed about various risks associated with IV iron such as allergic reaction, hypersensitivity reaction, headache, flushing, joint aches or pains, local IV infiltration and skin discoloration.  After our discussion patient was in agreement and proceeding with IV iron treatment for  Anemia.    In addition, given her B12 level is borderline low, she will need IM b12 injection.   I will ask my nurse to teach her and her family how  to do B12 injection at home.   I will arrange for daily b12 injection daily for 7 days, then weekly for 4 weeks and then monthly b12 for duration of pregnancy.     I have also sent prescription of folic acid 1 mg to her pharmacy.     Recommendations:   - IV injectofer 750 mg IV x 2 infusions, 1 week apart.   - IM b12 injections as above.   - Daily folic acid 1 mg po daily.     Prescriptions sent today: IM cobalamine, folic acid.   IV iron infusion arranged.       Thank you for involving me in Ms Guzmán's care.     Please call us if any questions/concerns.     Navi Murphy MD   Hematology Oncology

## 2020-06-01 ENCOUNTER — TELEPHONE (OUTPATIENT)
Dept: ONCOLOGY | Facility: HOSPITAL | Age: 20
End: 2020-06-01

## 2020-06-01 NOTE — TELEPHONE ENCOUNTER
Informed patient of information. Gave date/time of all appointments. She verbalized understanding.

## 2020-06-01 NOTE — TELEPHONE ENCOUNTER
----- Message from Susan Murphy MD sent at 5/30/2020 10:33 AM CDT -----  b12 is low. Please teach patient/family how to do b12 injections. Prescriptions sent to her pharmacy.   7 daily b12 shots, followed by 4 weekly followed by monthly for remainder of pregnancy.   She will also need to take folic acid daily.   Prescriptions sent to her pharmacy.

## 2020-06-02 ENCOUNTER — TELEPHONE (OUTPATIENT)
Dept: OBSTETRICS AND GYNECOLOGY | Facility: CLINIC | Age: 20
End: 2020-06-02

## 2020-06-04 ENCOUNTER — TELEPHONE (OUTPATIENT)
Dept: OBSTETRICS AND GYNECOLOGY | Facility: CLINIC | Age: 20
End: 2020-06-04

## 2020-06-04 DIAGNOSIS — O99.810 ABNORMAL GLUCOSE TOLERANCE TEST (GTT) DURING PREGNANCY, ANTEPARTUM: Primary | ICD-10-CM

## 2020-06-05 ENCOUNTER — TELEPHONE (OUTPATIENT)
Dept: ONCOLOGY | Facility: HOSPITAL | Age: 20
End: 2020-06-05

## 2020-06-05 ENCOUNTER — INFUSION (OUTPATIENT)
Dept: ONCOLOGY | Facility: HOSPITAL | Age: 20
End: 2020-06-05

## 2020-06-05 VITALS
HEART RATE: 92 BPM | DIASTOLIC BLOOD PRESSURE: 60 MMHG | SYSTOLIC BLOOD PRESSURE: 128 MMHG | TEMPERATURE: 98.1 F | RESPIRATION RATE: 18 BRPM

## 2020-06-05 DIAGNOSIS — O99.012 ANEMIA IN PREGNANCY, SECOND TRIMESTER: Primary | ICD-10-CM

## 2020-06-05 PROCEDURE — 96374 THER/PROPH/DIAG INJ IV PUSH: CPT | Performed by: INTERNAL MEDICINE

## 2020-06-05 PROCEDURE — 25010000002 FERRIC CARBOXYMALTOSE 750 MG/15ML SOLUTION 15 ML VIAL: Performed by: INTERNAL MEDICINE

## 2020-06-05 RX ORDER — METHYLPREDNISOLONE SODIUM SUCCINATE 125 MG/2ML
125 INJECTION, POWDER, LYOPHILIZED, FOR SOLUTION INTRAMUSCULAR; INTRAVENOUS AS NEEDED
Status: DISCONTINUED | OUTPATIENT
Start: 2020-06-05 | End: 2020-06-05

## 2020-06-05 RX ORDER — METHYLPREDNISOLONE SODIUM SUCCINATE 125 MG/2ML
125 INJECTION, POWDER, LYOPHILIZED, FOR SOLUTION INTRAMUSCULAR; INTRAVENOUS AS NEEDED
Status: CANCELLED | OUTPATIENT
Start: 2020-06-05

## 2020-06-05 RX ORDER — DIPHENHYDRAMINE HYDROCHLORIDE 50 MG/ML
50 INJECTION INTRAMUSCULAR; INTRAVENOUS AS NEEDED
Status: DISCONTINUED | OUTPATIENT
Start: 2020-06-05 | End: 2020-06-05

## 2020-06-05 RX ORDER — SODIUM CHLORIDE 9 MG/ML
250 INJECTION, SOLUTION INTRAVENOUS ONCE
Status: COMPLETED | OUTPATIENT
Start: 2020-06-05 | End: 2020-06-05

## 2020-06-05 RX ORDER — SODIUM CHLORIDE 9 MG/ML
250 INJECTION, SOLUTION INTRAVENOUS ONCE
Status: CANCELLED | OUTPATIENT
Start: 2020-06-05

## 2020-06-05 RX ORDER — DIPHENHYDRAMINE HYDROCHLORIDE 50 MG/ML
50 INJECTION INTRAMUSCULAR; INTRAVENOUS AS NEEDED
Status: CANCELLED | OUTPATIENT
Start: 2020-06-05

## 2020-06-05 RX ADMIN — FERRIC CARBOXYMALTOSE INJECTION 750 MG: 50 INJECTION, SOLUTION INTRAVENOUS at 13:22

## 2020-06-05 RX ADMIN — SODIUM CHLORIDE 250 ML: 9 INJECTION, SOLUTION INTRAVENOUS at 13:22

## 2020-06-05 NOTE — PROGRESS NOTES
Pt given education and instructions on how to give IM b12 injection. Pt voiced understanding.   Oksana Young RN  June 5, 2020  13:43

## 2020-06-05 NOTE — PATIENT INSTRUCTIONS
Intramuscular Injection Instructions Using a Syringe and Vial, Adult  An intramuscular (IM) injection is a shot of medicine that is given into a muscle. An IM injection may be given when medicine needs to start working right away, or when medicine cannot be given any other way. You will use a single-use syringe to give the IM injection. The medicine comes in a bottle (vial). You will fill the syringe with medicine from the vial.  Before your first injection, your health care provider will show you or your caregiver how to do the injection at home. Use only the syringe, needle, and medicine that your health care provider prescribes. Use each syringe and needle only one time.  What are the risks?  With proper training, this is a safe procedure. However, problems may occur. Common problems include pain and bruising at the injection site. Other problems may include:  · Bleeding.  · Nerve injury.  · Infection.  · The medicine going into the blood stream (intravenous administration).  · Muscle injury from the injections.  · Allergic reaction to the medicine.  Supplies needed:  · Medication guide or package insert that came with the medicine prescribed by your health care provider.  ? Follow directions from the guide about how to prepare and give the injection. This is important because the directions may be different for each medicine.  · Medicine prescribed by your health care provider.  · Syringe.  · Needle. Use the needle length and size (gauge) that your health care provider or pharmacist gives to you. Do not reuse needles. Use each needle only once.  · Alcohol wipes.  · Bandage.  · A container for syringe disposal.  ? This may be a puncture-proof sharps container or a hard plastic container that has a secure lid, such as an empty laundry detergent bottle.  How to choose a site for injection  Follow instructions from your health care provider about where to give the injection. Generally:  · The hip is the preferred  "injection site.  · The injection may also be given in the upper arm or outer thigh, depending on the amount of medicine in the injection and your muscle mass.  Do not inject the medicine in the same spot each time.  Do not inject over areas of skin that are open, infected, or bruised.  How to locate an injection site  Refer to the following instructions to find the exact location of each injection site.  Outer thigh    1. Imagine that the thigh is divided into three equal sections (thirds) between the knee and the hip.  2. The injection site area is in the middle third, on the outer side of the thigh.  Upper arm    1. Find the bony point at the top of the arm (acromion process). Place two fingers just under the acromion process.  2. Under the two fingers, picture an upside-down triangle over the arm muscle. The injection site is in the middle of the triangle.  Hip    1. Place the palm of your hand on the outside of the hip so your wrist is at the top of the upper leg.  2. Your thumb should point toward the groin.  3. Use your ring finger and little finger to feel the upper edge of the pelvic bone.  4. Spread your index finger and middle finger apart from each other into a \"V\" shape.  5. The injection site is the area between those fingers.  Preparing to give an IM injection  1. Check the medicine and syringe for any damage. If you notice any of the following, do not give the medicine and contact your pharmacy or health care provider.  ? Cracked glass vial.  ? Cloudy medicine.  ? Clumps or solids floating in the medicine.  2. Get in a comfortable position so that the muscle site is relaxed. This depends on the site of the injection.  How to give an IM injection using medicine from a vial  1. Wash your hands with soap and water. If soap and water are not available, use hand .  2. Use an alcohol wipe to clean the site where you will be injecting the needle. Let the site air-dry.  3. Gently roll the medicine " vial between your hands to mix it. Do not shake the vial.  4. If there is a plastic covering on the vial, remove it. Clean the top rubber part of the vial with an alcohol wipe.  5. Remove the plastic cover from the needle on the syringe. Do not let the needle touch anything.  6. Pull the plunger back to draw air into the syringe. Stop the plunger when the dose indicator gets to the line that is the same as your medicine dose.  7. Push the needle through the rubber on the top of the vial. Do not turn the vial over.  8. Push the plunger in all the way. This pushes air into the vial.  9. While the needle is still in the vial, turn the vial upside down and hold it at eye level.  10. Pull back slowly on the plunger to draw medicine into the syringe. Stop when the dose indicator is at the correct amount of medicine needed.  11. Remove the needle from the vial. Do not let the needle touch anything.  12. Hold the syringe with the needle pointing up. Check the syringe for any remaining air bubbles. If there are air bubbles, flick the syringe with your finger until the air bubbles rise to the top. Then, gently push on the plunger until you can see a drop of medicine appear at the tip of the needle. This will clear any remaining air bubbles from the syringe.  13. Hold the syringe in your writing hand like a pencil.  14. Insert the entire needle straight into the injection site. The needle should be at a 90-degree angle (perpendicular) to the skin. Push the needle all the way through the skin and into the muscle. Continue to hold the syringe with your writing hand.  ? If instructed, use your other hand to pull back slightly on the plunger (aspirate) to see if any blood enters the syringe. If there is blood, do not inject the medicine. Remove the needle and start over with a new needle and syringe. Most medicines will not require that you do this step.  15. Use the thumb of your writing hand to push the plunger until the syringe  is empty. If the medicine is a gel, you may need to push harder on the plunger to press the medicine out of the syringe and into the muscle.  16. Pull the needle straight out of the skin.  17. Press and hold the alcohol wipe over the injection site until bleeding stops. Do not rub the area.  18. Cover the injection site with a bandage, if needed.  19. Write down the date, time, and location of each injection that you give. This is important.  20. If more than one injection is needed, give an injection at least 1 inch (2.5 cm) away from the previous injection site.  How to safely throw away the supplies  If you are using a syringe that does not have a safety system for shielding the needle after injection:  · Do not recap the needle. Place the syringe and needle in the disposal container.  If your syringe has a safety system for shielding the needle after injection:  · Firmly push down on the plunger after you complete the injection. The protective sleeve will automatically cover the needle, and you will hear a click. The click means that the needle is safely covered.  Follow the disposal regulations for the area where you live. Do not use any syringe or needle more than one time. You may throw the empty medicine vial in the trash.  Contact a health care provider if:  · You have difficulty giving the injection.  · You think that the injection was not given correctly.  · You have difficulty with any of the supplies.  · The medicine causes side effects.  · Rashes develop on the skin.  · A fever develops.  · The condition that is being treated gets worse.  Get help right away if:  · Any of these symptoms develop after the injection is given:  ? Difficulty breathing.  ? Chest pain.  ? A rash over most or all of the body.  ? Swelling of the lips or tongue.  ? Difficulty swallowing.  Summary  · An intramuscular (IM) injection is a shot of medicine that is given into a muscle.  · Use only the syringe, needle, and medicine  that your health care provider prescribes. Use each syringe and needle only one time.  · Follow instructions from your health care provider about where to give an injection.  This information is not intended to replace advice given to you by your health care provider. Make sure you discuss any questions you have with your health care provider.  Document Released: 04/03/2012 Document Revised: 04/24/2019 Document Reviewed: 04/24/2019  Elsevier Patient Education © 2020 [employer]

## 2020-06-05 NOTE — TELEPHONE ENCOUNTER
----- Message from Oksana Young RN sent at 6/5/2020  1:25 PM CDT -----  Regarding: b12 syringes  Pt was not given any syringes for her b12. She states that she will need that called in to her pharmacy.

## 2020-06-12 ENCOUNTER — LAB (OUTPATIENT)
Dept: LAB | Facility: HOSPITAL | Age: 20
End: 2020-06-12

## 2020-06-12 DIAGNOSIS — O99.810 ABNORMAL GLUCOSE TOLERANCE TEST (GTT) DURING PREGNANCY, ANTEPARTUM: ICD-10-CM

## 2020-06-12 LAB
GLUCOSE 1H P 100 G GLC PO SERPL-MCNC: 113 MG/DL (ref 74–180)
GLUCOSE 2H P 100 G GLC PO SERPL-MCNC: 89 MG/DL (ref 74–155)
GLUCOSE 3H P 100 G GLC PO SERPL-MCNC: 68 MG/DL (ref 74–140)
GLUCOSE P FAST SERPL-MCNC: 83 MG/DL (ref 65–99)

## 2020-06-12 PROCEDURE — 82952 GTT-ADDED SAMPLES: CPT

## 2020-06-12 PROCEDURE — 36415 COLL VENOUS BLD VENIPUNCTURE: CPT

## 2020-06-12 PROCEDURE — 82951 GLUCOSE TOLERANCE TEST (GTT): CPT

## 2020-06-18 ENCOUNTER — ROUTINE PRENATAL (OUTPATIENT)
Dept: OBSTETRICS AND GYNECOLOGY | Facility: CLINIC | Age: 20
End: 2020-06-18

## 2020-06-18 VITALS — WEIGHT: 130 LBS | SYSTOLIC BLOOD PRESSURE: 105 MMHG | BODY MASS INDEX: 25.39 KG/M2 | DIASTOLIC BLOOD PRESSURE: 63 MMHG

## 2020-06-18 DIAGNOSIS — O99.322 DRUG USE AFFECTING PREGNANCY IN SECOND TRIMESTER: ICD-10-CM

## 2020-06-18 DIAGNOSIS — O23.42 URINARY TRACT INFECTION IN MOTHER DURING SECOND TRIMESTER OF PREGNANCY: ICD-10-CM

## 2020-06-18 DIAGNOSIS — O99.012 ANEMIA IN PREGNANCY, SECOND TRIMESTER: ICD-10-CM

## 2020-06-18 DIAGNOSIS — O34.211 ENCOUNTER FOR MATERNAL CARE FOR LOW TRANSVERSE SCAR FROM PREVIOUS CESAREAN DELIVERY: ICD-10-CM

## 2020-06-18 DIAGNOSIS — Z34.82 SUPERVISION OF NORMAL INTRAUTERINE PREGNANCY IN MULTIGRAVIDA IN SECOND TRIMESTER: ICD-10-CM

## 2020-06-18 DIAGNOSIS — N13.30 HYDRONEPHROSIS DETERMINED BY ULTRASOUND: ICD-10-CM

## 2020-06-18 DIAGNOSIS — Z3A.27 27 WEEKS GESTATION OF PREGNANCY: Primary | ICD-10-CM

## 2020-06-18 PROCEDURE — 99213 OFFICE O/P EST LOW 20 MIN: CPT | Performed by: OBSTETRICS & GYNECOLOGY

## 2020-06-18 NOTE — PROGRESS NOTES
CC: Prenatal visit    Carol Guzmán is a 20 y.o.  at 27w3d.  Doing well.  Denies contractions, LOF, or VB.  Reports good FM.  Reviewed 3-hour glucose test which was normal    /63   Wt 59 kg (130 lb)   LMP  (LMP Unknown)   BMI 25.39 kg/m²     Fundal Height (cm): 27 cm  Fetal Heart Rate: 140    Pregnancy Problems (from 19 to present)     Problem Noted Resolved    Urinary tract infection in mother during second trimester of pregnancy 2020 by Juliane Mcrae APRN No    Overview Signed 2020  3:39 PM by Juliane Mcrae APRN     Needs GOYO in 2 weeks         Hydronephrosis determined by ultrasound 2020 by Juliane Mcrae APRN No    Overview Signed 2020  3:40 PM by Juliane Mcrae APRN     Dx at [facility] on . Pain is improving          Drug use affecting pregnancy in second trimester 3/2/2020 by Fransisca Francois APRN No    Overview Signed 3/2/2020  2:13 PM by Fransisca Francois APRN     +THC         Anemia in pregnancy, second trimester 3/2/2020 by Fransisca Francois APRN No    Overview Signed 3/2/2020  3:32 PM by Fransisca Francois APRN     Ferrous sulfate supplementation         Supervision of normal intrauterine pregnancy in multigravida in second trimester 2020 by Fransisca Francois APRN No    Overview Signed 3/2/2020  2:19 PM by Fransisca Francois APRN      A pos / Rubella Immune / GBS @ 35-36 wks  Dating: by CRL  Flu: Declined  Genetics: Declined  Anatomy: 20 weeks  Glucola: 28 wks  Tdap: 28 wks  Lab Results   Component Value Date    WBC 8.79 2020    HGB 10.5 (L) 2020    HCT 31.2 (L) 2020    MCV 90.7 2020     2020              Encounter for maternal care for low transverse scar from previous  delivery 2020 by Fransisca Francois APRN No    Overview Addendum 3/30/2020  2:21 PM by Cook, Juliane W, APRN     x1 for fetal intolerance  Desires a repeat C/S               A/P: Carol Guzmán is a 20 y.o.   at 27w3d.  Doing well appropriately progressing pregnancy no further kidney problems at this time.  Return to see me in 2 weeks, sooner as needed.  Fetal kick count  labor precautions given.  Patient now desires repeat low transverse  section.  - RTC in 2 weeks  - Reviewed COVID-19 visitation policy  - Reviewed COVID-19 precautions     Diagnosis Plan   1. 27 weeks gestation of pregnancy     2. Urinary tract infection in mother during second trimester of pregnancy     3. Hydronephrosis determined by ultrasound     4. Drug use affecting pregnancy in second trimester     5. Anemia in pregnancy, second trimester     6. Supervision of normal intrauterine pregnancy in multigravida in second trimester     7. Encounter for maternal care for low transverse scar from previous  delivery       Juan Chen DO  2020  11:00

## 2020-07-02 ENCOUNTER — ROUTINE PRENATAL (OUTPATIENT)
Dept: OBSTETRICS AND GYNECOLOGY | Facility: CLINIC | Age: 20
End: 2020-07-02

## 2020-07-02 VITALS — SYSTOLIC BLOOD PRESSURE: 118 MMHG | WEIGHT: 136 LBS | BODY MASS INDEX: 26.56 KG/M2 | DIASTOLIC BLOOD PRESSURE: 58 MMHG

## 2020-07-02 DIAGNOSIS — O99.012 ANEMIA IN PREGNANCY, SECOND TRIMESTER: ICD-10-CM

## 2020-07-02 DIAGNOSIS — R87.810 ASCUS WITH POSITIVE HIGH RISK HPV CERVICAL: ICD-10-CM

## 2020-07-02 DIAGNOSIS — O34.211 ENCOUNTER FOR MATERNAL CARE FOR LOW TRANSVERSE SCAR FROM PREVIOUS CESAREAN DELIVERY: ICD-10-CM

## 2020-07-02 DIAGNOSIS — O99.322 DRUG USE AFFECTING PREGNANCY IN SECOND TRIMESTER: ICD-10-CM

## 2020-07-02 DIAGNOSIS — R87.610 ASCUS WITH POSITIVE HIGH RISK HPV CERVICAL: ICD-10-CM

## 2020-07-02 DIAGNOSIS — O23.42 URINARY TRACT INFECTION IN MOTHER DURING SECOND TRIMESTER OF PREGNANCY: ICD-10-CM

## 2020-07-02 DIAGNOSIS — Z34.82 SUPERVISION OF NORMAL INTRAUTERINE PREGNANCY IN MULTIGRAVIDA IN SECOND TRIMESTER: ICD-10-CM

## 2020-07-02 DIAGNOSIS — O36.5931 INTRAUTERINE GROWTH RESTRICTION (IUGR) AFFECTING CARE OF MOTHER, THIRD TRIMESTER, FETUS 1: ICD-10-CM

## 2020-07-02 DIAGNOSIS — Z3A.29 29 WEEKS GESTATION OF PREGNANCY: Primary | ICD-10-CM

## 2020-07-02 PROCEDURE — 90471 IMMUNIZATION ADMIN: CPT | Performed by: OBSTETRICS & GYNECOLOGY

## 2020-07-02 PROCEDURE — 90715 TDAP VACCINE 7 YRS/> IM: CPT | Performed by: OBSTETRICS & GYNECOLOGY

## 2020-07-02 PROCEDURE — 99213 OFFICE O/P EST LOW 20 MIN: CPT | Performed by: OBSTETRICS & GYNECOLOGY

## 2020-07-03 RX ORDER — NITROFURANTOIN 25; 75 MG/1; MG/1
100 CAPSULE ORAL DAILY
Qty: 30 CAPSULE | Refills: 2 | Status: SHIPPED | OUTPATIENT
Start: 2020-07-03 | End: 2020-08-02

## 2020-07-03 NOTE — PROGRESS NOTES
CC: Prenatal visit    Carol Guzmán is a 20 y.o.  at 29w4d.  Doing well.  Denies contractions, LOF, or VB.  Reports good FM.  Plan to start patient on daily Macrobid for pyelonephritis.     /58   Wt 61.7 kg (136 lb)   LMP  (LMP Unknown)   BMI 26.56 kg/m²     Fundal Height (cm): 27 cm  Fetal Heart Rate: 140    Pregnancy Problems (from 20 to present)     Problem Noted Resolved    Urinary tract infection in mother during second trimester of pregnancy 2020 by Juliane Mcrae APRN No    Overview Signed 2020  3:39 PM by Juliane Mcrae APRN     Needs GOYO in 2 weeks         Hydronephrosis determined by ultrasound 2020 by Juliane Mcrae APRN No    Overview Signed 2020  3:40 PM by Juliane Mcrae APRN     Dx at [facility] on . Pain is improving          Drug use affecting pregnancy in second trimester 3/2/2020 by Fransisca Francois APRN No    Overview Signed 3/2/2020  2:13 PM by Fransisca Francois APRN     +THC         Anemia in pregnancy, second trimester 3/2/2020 by Fransisca Francois APRN No    Overview Signed 3/2/2020  3:32 PM by Fransisca Francois APRN     Ferrous sulfate supplementation         Supervision of normal intrauterine pregnancy in multigravida in second trimester 2020 by Fransisca Francois APRN No    Overview Signed 3/2/2020  2:19 PM by Fransisca Francois APRN      A pos / Rubella Immune / GBS @ 35-36 wks  Dating: by CRL  Flu: Declined  Genetics: Declined  Anatomy: 20 weeks  Glucola: 28 wks  Tdap: 28 wks  Lab Results   Component Value Date    WBC 8.79 2020    HGB 10.5 (L) 2020    HCT 31.2 (L) 2020    MCV 90.7 2020     2020              Encounter for maternal care for low transverse scar from previous  delivery 2020 by Fransisca Francois APRN No    Overview Addendum 3/30/2020  2:21 PM by Juliane Mcrae APRN     x1 for fetal intolerance  Desires a repeat C/S               A/P: Carol Guzmán  is a 20 y.o.  at 29w4d.  Patient with size less than dates will get growth ultrasound in 2 weeks.  Plan to have patient start on Macrobid daily for pyelonephritis earlier in the pregnancy.  Bleeding and cramping precautions given  labor and fetal kick count precautions given.  Patient to return sooner as needed.  - RTC in 2 weeks  - Reviewed COVID-19 visitation policy  - Reviewed COVID-19 precautions     Diagnosis Plan   1. 29 weeks gestation of pregnancy  Tdap Vaccine Greater Than or Equal To 6yo IM   2. Intrauterine growth restriction (IUGR) affecting care of mother, third trimester, fetus 1  US Ob Follow Up Transabdominal Approach   3. Supervision of normal intrauterine pregnancy in multigravida in second trimester     4. Encounter for maternal care for low transverse scar from previous  delivery     5. Drug use affecting pregnancy in second trimester     6. ASCUS with positive high risk HPV cervical     7. Anemia in pregnancy, second trimester     8. Urinary tract infection in mother during second trimester of pregnancy       Juan Chen DO  7/3/2020  16:20

## 2020-07-08 DIAGNOSIS — O99.012 ANEMIA IN PREGNANCY, SECOND TRIMESTER: Primary | ICD-10-CM

## 2020-07-10 ENCOUNTER — LAB (OUTPATIENT)
Dept: ONCOLOGY | Facility: HOSPITAL | Age: 20
End: 2020-07-10

## 2020-07-10 ENCOUNTER — OFFICE VISIT (OUTPATIENT)
Dept: ONCOLOGY | Facility: CLINIC | Age: 20
End: 2020-07-10

## 2020-07-10 ENCOUNTER — TELEPHONE (OUTPATIENT)
Dept: ONCOLOGY | Facility: HOSPITAL | Age: 20
End: 2020-07-10

## 2020-07-10 VITALS
SYSTOLIC BLOOD PRESSURE: 125 MMHG | RESPIRATION RATE: 18 BRPM | DIASTOLIC BLOOD PRESSURE: 60 MMHG | HEIGHT: 59 IN | HEART RATE: 91 BPM | TEMPERATURE: 97.6 F | WEIGHT: 137.8 LBS | BODY MASS INDEX: 27.78 KG/M2

## 2020-07-10 DIAGNOSIS — D50.9 IRON DEFICIENCY ANEMIA, UNSPECIFIED IRON DEFICIENCY ANEMIA TYPE: ICD-10-CM

## 2020-07-10 DIAGNOSIS — O99.012 ANEMIA IN PREGNANCY, SECOND TRIMESTER: ICD-10-CM

## 2020-07-10 DIAGNOSIS — E53.8 B12 DEFICIENCY: ICD-10-CM

## 2020-07-10 DIAGNOSIS — O99.012 ANEMIA IN PREGNANCY, SECOND TRIMESTER: Primary | ICD-10-CM

## 2020-07-10 LAB
BASOPHILS # BLD AUTO: 0.03 10*3/MM3 (ref 0–0.2)
BASOPHILS NFR BLD AUTO: 0.3 % (ref 0–1.5)
DEPRECATED RDW RBC AUTO: 51.8 FL (ref 37–54)
EOSINOPHIL # BLD AUTO: 0.25 10*3/MM3 (ref 0–0.4)
EOSINOPHIL NFR BLD AUTO: 2.4 % (ref 0.3–6.2)
ERYTHROCYTE [DISTWIDTH] IN BLOOD BY AUTOMATED COUNT: 14.6 % (ref 12.3–15.4)
FERRITIN SERPL-MCNC: 437.9 NG/ML (ref 13–150)
FOLATE SERPL-MCNC: >20 NG/ML (ref 4.78–24.2)
HCT VFR BLD AUTO: 23.4 % (ref 34–46.6)
HGB BLD-MCNC: 7.9 G/DL (ref 12–15.9)
IMM GRANULOCYTES # BLD AUTO: 0.32 10*3/MM3 (ref 0–0.05)
IMM GRANULOCYTES NFR BLD AUTO: 3.1 % (ref 0–0.5)
IRON 24H UR-MRATE: 129 MCG/DL (ref 37–145)
IRON SATN MFR SERPL: 42 % (ref 20–50)
LYMPHOCYTES # BLD AUTO: 1.78 10*3/MM3 (ref 0.7–3.1)
LYMPHOCYTES NFR BLD AUTO: 17.2 % (ref 19.6–45.3)
MCH RBC QN AUTO: 32.2 PG (ref 26.6–33)
MCHC RBC AUTO-ENTMCNC: 33.8 G/DL (ref 31.5–35.7)
MCV RBC AUTO: 95.5 FL (ref 79–97)
MONOCYTES # BLD AUTO: 0.64 10*3/MM3 (ref 0.1–0.9)
MONOCYTES NFR BLD AUTO: 6.2 % (ref 5–12)
NEUTROPHILS NFR BLD AUTO: 7.33 10*3/MM3 (ref 1.7–7)
NEUTROPHILS NFR BLD AUTO: 70.8 % (ref 42.7–76)
NRBC BLD AUTO-RTO: 0 /100 WBC (ref 0–0.2)
PLATELET # BLD AUTO: 188 10*3/MM3 (ref 140–450)
PMV BLD AUTO: 8.2 FL (ref 6–12)
RBC # BLD AUTO: 2.45 10*6/MM3 (ref 3.77–5.28)
TIBC SERPL-MCNC: 310 MCG/DL (ref 298–536)
TRANSFERRIN SERPL-MCNC: 208 MG/DL (ref 200–360)
VIT B12 BLD-MCNC: 583 PG/ML (ref 211–946)
WBC # BLD AUTO: 10.35 10*3/MM3 (ref 3.4–10.8)

## 2020-07-10 PROCEDURE — 82607 VITAMIN B-12: CPT | Performed by: INTERNAL MEDICINE

## 2020-07-10 PROCEDURE — 82728 ASSAY OF FERRITIN: CPT | Performed by: INTERNAL MEDICINE

## 2020-07-10 PROCEDURE — 83540 ASSAY OF IRON: CPT | Performed by: INTERNAL MEDICINE

## 2020-07-10 PROCEDURE — 82746 ASSAY OF FOLIC ACID SERUM: CPT | Performed by: INTERNAL MEDICINE

## 2020-07-10 PROCEDURE — 84466 ASSAY OF TRANSFERRIN: CPT | Performed by: INTERNAL MEDICINE

## 2020-07-10 PROCEDURE — 85025 COMPLETE CBC W/AUTO DIFF WBC: CPT | Performed by: INTERNAL MEDICINE

## 2020-07-10 PROCEDURE — G0463 HOSPITAL OUTPT CLINIC VISIT: HCPCS | Performed by: INTERNAL MEDICINE

## 2020-07-10 PROCEDURE — 99213 OFFICE O/P EST LOW 20 MIN: CPT | Performed by: INTERNAL MEDICINE

## 2020-07-10 NOTE — TELEPHONE ENCOUNTER
----- Message from Susan Murphy MD sent at 7/10/2020  2:13 PM CDT -----  Let patient know that her iron level is normal.

## 2020-07-12 NOTE — PROGRESS NOTES
"  Subjective     Carol Corina Guzmán was seen in follow-up for iron deficiency and B12 deficiency anemia during pregnancy.  She received 2 doses of IV Injectafer and is receiving B12 injection.  Her fatigue has improved remarkably.  She is feeling much better.  She was instructed to continue monthly B12 injection and folic acid supplements.  ROS as below.     Past Medical History, Past Surgical History, Social History, Family History have been reviewed and are without significant changes except as mentioned.    Review of Systems       CONSTITUTIONAL: fatigue + No weakness, weight loss, fever, chills.  HEENT: Eyes: No visual loss, blurred vision, double vision or yellow sclerae. Ears, Nose, Throat: No hearing loss, sneezing, congestion, runny nose or sore throat.  SKIN: No rash or itching.  CARDIOVASCULAR:  No chest pain, chest pressure or chest discomfort. No  edema.  RESPIRATORY: No shortness of breath, cough or sputum.  GASTROINTESTINAL: No anorexia, nausea, vomiting or diarrhea. No abdominal pain or blood.  GENITOURINARY: Negative for urgency, frequency or dysuria.   NEUROLOGICAL: No headache, dizziness, syncope, paralysis, ataxia, numbness or tingling in the extremities. No change in bowel or bladder control.  ENDOCRINOLOGIC: No reports of sweating, cold or heat intolerance. No polyuria or polydipsia.  ALLERGIES: No history of asthma, hives, eczema or rhinitis.    Medications:  The current medication list was reviewed in the EMR    ALLERGIES:    Allergies   Allergen Reactions   • Percocet [Oxycodone-Acetaminophen] Hives       Objective      Vitals:    07/10/20 1101   BP: 125/60   Pulse: 91   Resp: 18   Temp: 97.6 °F (36.4 °C)   TempSrc: Temporal   Weight: 62.5 kg (137 lb 12.8 oz)   Height: 149.9 cm (59\")   PainSc: 0-No pain     Current Status 7/10/2020   ECOG score 0       Physical Exam      GENERAL: Alert, awake, oriented.  Well dressed.  Not in apparent distress. Vitals as above.   HEAD: Normocephalic, " atraumatic.   EYES: PERRL, EOMI.  vision is grossly intact. Conjunctival pallor +   NECK: Supple, no adenopathy or thyromegaly.   THROAT: Normal oral cavity and pharynx. No inflammation, swelling, exudate, or lesions.  CARDIAC: Normal S1 and S2. No S3, S4 or murmurs. Rhythm is regular.  Extremities are warm and well perfused.   LUNGS: Clear to auscultation and percussion without rales, rhonchi, wheezing or diminished breath sounds.  ABDOMEN: Positive bowel sounds. Soft, pregnant + , nontender. No guarding or rebound. No masses.    RECENT LABS: Independently reviewed and summarized  Hematology WBC   Date Value Ref Range Status   07/10/2020 10.35 3.40 - 10.80 10*3/mm3 Final     RBC   Date Value Ref Range Status   07/10/2020 2.45 (L) 3.77 - 5.28 10*6/mm3 Final     Hemoglobin   Date Value Ref Range Status   07/10/2020 7.9 (L) 12.0 - 15.9 g/dL Final     Hematocrit   Date Value Ref Range Status   07/10/2020 23.4 (L) 34.0 - 46.6 % Final     Platelets   Date Value Ref Range Status   07/10/2020 188 140 - 450 10*3/mm3 Final          Carol Guzmán reports a pain score of 0.  Given her pain assessment as noted, treatment options were discussed and the following options were decided upon as a follow-up plan to address the patient's pain: continuation of current treatment plan for pain.    PHQ-9 Total Score: 0  Depression screen negative.     Diagnosis:   (1) Anemia in pregnancy, second trimester   (2) Iron deficiency anemia   (3) B12 deficiency     Assessment/Plan     Her anemia is persistent however iron studies and B12 are normal.  Likely dilutional anemia from pregnancy.  She is feeling much better after 2 doses of IV iron and B12 injection.  She was encouraged to continue monthly B12 injection and daily folic acid.    We will recheck her CBCs and iron studies in 6 weeks and consider additional IV iron if needed.      Recommendations:   - Recheck CBC and iron studies in 6 weeks.   - Continue monthly b12 injections.   -  Continue folic acid.     Navi Murphy MD     7/12/2020      CC:

## 2020-07-16 ENCOUNTER — ROUTINE PRENATAL (OUTPATIENT)
Dept: OBSTETRICS AND GYNECOLOGY | Facility: CLINIC | Age: 20
End: 2020-07-16

## 2020-07-16 VITALS — DIASTOLIC BLOOD PRESSURE: 62 MMHG | WEIGHT: 138 LBS | SYSTOLIC BLOOD PRESSURE: 118 MMHG | BODY MASS INDEX: 27.87 KG/M2

## 2020-07-16 DIAGNOSIS — O34.211 ENCOUNTER FOR MATERNAL CARE FOR LOW TRANSVERSE SCAR FROM PREVIOUS CESAREAN DELIVERY: ICD-10-CM

## 2020-07-16 DIAGNOSIS — O99.322 DRUG USE AFFECTING PREGNANCY IN SECOND TRIMESTER: ICD-10-CM

## 2020-07-16 DIAGNOSIS — O99.012 ANEMIA IN PREGNANCY, SECOND TRIMESTER: ICD-10-CM

## 2020-07-16 DIAGNOSIS — Z3A.31 31 WEEKS GESTATION OF PREGNANCY: Primary | ICD-10-CM

## 2020-07-16 DIAGNOSIS — N13.30 HYDRONEPHROSIS DETERMINED BY ULTRASOUND: ICD-10-CM

## 2020-07-16 DIAGNOSIS — Z34.82 SUPERVISION OF NORMAL INTRAUTERINE PREGNANCY IN MULTIGRAVIDA IN SECOND TRIMESTER: ICD-10-CM

## 2020-07-16 DIAGNOSIS — O23.42 URINARY TRACT INFECTION IN MOTHER DURING SECOND TRIMESTER OF PREGNANCY: ICD-10-CM

## 2020-07-16 PROCEDURE — 99213 OFFICE O/P EST LOW 20 MIN: CPT | Performed by: OBSTETRICS & GYNECOLOGY

## 2020-07-16 RX ORDER — OMEPRAZOLE 20 MG/1
20 TABLET, DELAYED RELEASE ORAL DAILY
Qty: 30 TABLET | Refills: 3 | Status: SHIPPED | OUTPATIENT
Start: 2020-07-16 | End: 2020-09-17

## 2020-07-16 NOTE — PROGRESS NOTES
CC: Prenatal visit    Carol Guzmán is a 20 y.o.  at 31w3d.  Doing well.  Denies contractions, LOF, or VB.  Reports good FM.  Growth scan today at the 45th percentile.  8 out of 8 BPP.  Overall reassuring.  Patient does have some heartburn would like to start on medication.  And is taking Macrobid daily.    /62   Wt 62.6 kg (138 lb)   LMP  (LMP Unknown)   BMI 27.87 kg/m²     Fundal Height (cm): 31 cm  Fetal Heart Rate: 141    Pregnancy Problems (from 20 to present)     Problem Noted Resolved    Urinary tract infection in mother during second trimester of pregnancy 2020 by Juliane Mcrae APRN No    Overview Signed 2020  3:39 PM by Juliane Mcrae APRN     Needs GOYO in 2 weeks         Hydronephrosis determined by ultrasound 2020 by Juliane Mcrae APRN No    Overview Signed 2020  3:40 PM by Juliane Mcrae APRN     Dx at [facility] on . Pain is improving          Drug use affecting pregnancy in second trimester 3/2/2020 by Fransisca Francois APRN No    Overview Signed 3/2/2020  2:13 PM by Fransisca Francois APRN     +THC         Anemia in pregnancy, second trimester 3/2/2020 by Fransisca Francois APRN No    Overview Signed 3/2/2020  3:32 PM by Franissca Francois APRN     Ferrous sulfate supplementation         Supervision of normal intrauterine pregnancy in multigravida in second trimester 2020 by Fransisca Francois APRN No    Overview Signed 3/2/2020  2:19 PM by Fransisca Francois APRN      A pos / Rubella Immune / GBS @ 35-36 wks  Dating: by CRL  Flu: Declined  Genetics: Declined  Anatomy: 20 weeks  Glucola: 28 wks  Tdap: 28 wks  Lab Results   Component Value Date    WBC 8.79 2020    HGB 10.5 (L) 2020    HCT 31.2 (L) 2020    MCV 90.7 2020     2020              Encounter for maternal care for low transverse scar from previous  delivery 2020 by Fransisca Francois APRN No    Overview Addendum 3/30/2020   2:21 PM by Juliane Mcrae APRN     x1 for fetal intolerance  Desires a repeat C/S               A/P: Carol Guzmán is a 20 y.o.  at 31w3d.  Doing well with appropriately progressing pregnancy at this time.  Growth appropriate on ultrasound today.  Plan to have patient return to see me in 3 weeks, sooner as needed.   labor and fetal kick count precautions given.  Additional ultrasound studies if recommended by MFM.  - RTC in 3 weeks  - Reviewed COVID-19 visitation policy  - Reviewed COVID-19 precautions     Diagnosis Plan   1. Urinary tract infection in mother during second trimester of pregnancy     2. Hydronephrosis determined by ultrasound     3. Drug use affecting pregnancy in second trimester     4. Anemia in pregnancy, second trimester     5. Supervision of normal intrauterine pregnancy in multigravida in second trimester     6. Encounter for maternal care for low transverse scar from previous  delivery       Juan Chen DO  2020  11:09

## 2020-07-24 DIAGNOSIS — O36.5931 INTRAUTERINE GROWTH RESTRICTION (IUGR) AFFECTING CARE OF MOTHER, THIRD TRIMESTER, FETUS 1: ICD-10-CM

## 2020-07-27 DIAGNOSIS — O99.322 DRUG USE AFFECTING PREGNANCY IN SECOND TRIMESTER: Primary | ICD-10-CM

## 2020-07-27 DIAGNOSIS — O99.323 DRUG USE AFFECTING PREGNANCY IN THIRD TRIMESTER: ICD-10-CM

## 2020-07-27 DIAGNOSIS — O36.5931 IUGR (INTRAUTERINE GROWTH RESTRICTION) AFFECTING CARE OF MOTHER, THIRD TRIMESTER, FETUS 1: ICD-10-CM

## 2020-07-31 DIAGNOSIS — O36.5931 IUGR (INTRAUTERINE GROWTH RESTRICTION) AFFECTING CARE OF MOTHER, THIRD TRIMESTER, FETUS 1: ICD-10-CM

## 2020-08-06 ENCOUNTER — ROUTINE PRENATAL (OUTPATIENT)
Dept: OBSTETRICS AND GYNECOLOGY | Facility: CLINIC | Age: 20
End: 2020-08-06

## 2020-08-06 VITALS — BODY MASS INDEX: 28.68 KG/M2 | SYSTOLIC BLOOD PRESSURE: 100 MMHG | DIASTOLIC BLOOD PRESSURE: 58 MMHG | WEIGHT: 142 LBS

## 2020-08-06 DIAGNOSIS — O34.211 ENCOUNTER FOR MATERNAL CARE FOR LOW TRANSVERSE SCAR FROM PREVIOUS CESAREAN DELIVERY: ICD-10-CM

## 2020-08-06 DIAGNOSIS — O99.012 ANEMIA IN PREGNANCY, SECOND TRIMESTER: ICD-10-CM

## 2020-08-06 DIAGNOSIS — Z34.82 SUPERVISION OF NORMAL INTRAUTERINE PREGNANCY IN MULTIGRAVIDA IN SECOND TRIMESTER: ICD-10-CM

## 2020-08-06 DIAGNOSIS — O99.322 DRUG USE AFFECTING PREGNANCY IN SECOND TRIMESTER: ICD-10-CM

## 2020-08-06 DIAGNOSIS — O23.42 URINARY TRACT INFECTION IN MOTHER DURING SECOND TRIMESTER OF PREGNANCY: ICD-10-CM

## 2020-08-06 DIAGNOSIS — N13.30 HYDRONEPHROSIS DETERMINED BY ULTRASOUND: ICD-10-CM

## 2020-08-06 PROCEDURE — 99213 OFFICE O/P EST LOW 20 MIN: CPT | Performed by: OBSTETRICS & GYNECOLOGY

## 2020-08-06 NOTE — PROGRESS NOTES
CC: Prenatal visit    Carol Guzmán is a 20 y.o.  at 34w3d.  Doing well.  Denies contractions, LOF, or VB.  Reports good FM.  Continues to take Macrobid daily    /58   Wt 64.4 kg (142 lb)   LMP  (LMP Unknown)   BMI 28.68 kg/m²     Fundal Height (cm): 33 cm  Fetal Heart Rate: 150    Pregnancy Problems (from 20 to present)     Problem Noted Resolved    Urinary tract infection in mother during second trimester of pregnancy 2020 by Juliane Mcrae APRN No    Overview Signed 2020  3:39 PM by Juliane Mcrae APRN     Needs GOYO in 2 weeks         Hydronephrosis determined by ultrasound 2020 by Juliane Mcrae APRN No    Overview Signed 2020  3:40 PM by Juliane Mcrae APRN     Dx at [facility] on . Pain is improving          Drug use affecting pregnancy in second trimester 3/2/2020 by Fransisca Francois APRN No    Overview Signed 3/2/2020  2:13 PM by Fransisca Francois APRN     +THC         Anemia in pregnancy, second trimester 3/2/2020 by Fransisca Francois APRN No    Overview Signed 3/2/2020  3:32 PM by Fransisca Francois APRN     Ferrous sulfate supplementation         Supervision of normal intrauterine pregnancy in multigravida in second trimester 2020 by Fransisca Francois APRN No    Overview Signed 3/2/2020  2:19 PM by Fransisca Francois APRN      A pos / Rubella Immune / GBS @ 35-36 wks  Dating: by CRL  Flu: Declined  Genetics: Declined  Anatomy: 20 weeks  Glucola: 28 wks  Tdap: 28 wks  Lab Results   Component Value Date    WBC 8.79 2020    HGB 10.5 (L) 2020    HCT 31.2 (L) 2020    MCV 90.7 2020     2020              Encounter for maternal care for low transverse scar from previous  delivery 2020 by Fransisca Francois APRN No    Overview Addendum 3/30/2020  2:21 PM by Cook, Juliane W, APRN     x1 for fetal intolerance  Desires a repeat C/S               A/P: Carol Guzmán is a 20 y.o.  at  34w3d.  Doing well with appropriately progressing pregnancy.  Schedule repeat  on .  Patient to return to see me in 2 weeks, sooner as needed.  Fetal kick count and  labor precautions given.  Continue Macrobid at this time  - RTC in 2 weeks  - Reviewed COVID-19 visitation policy  - Reviewed COVID-19 precautions     Diagnosis Plan   1. Postpartum follow-up     2. Urinary tract infection in mother during second trimester of pregnancy     3. Hydronephrosis determined by ultrasound     4. Drug use affecting pregnancy in second trimester     5. Anemia in pregnancy, second trimester     6. Supervision of normal intrauterine pregnancy in multigravida in second trimester     7. Encounter for maternal care for low transverse scar from previous  delivery       Juan Chen DO  2020  11:29

## 2020-08-20 ENCOUNTER — ROUTINE PRENATAL (OUTPATIENT)
Dept: OBSTETRICS AND GYNECOLOGY | Facility: CLINIC | Age: 20
End: 2020-08-20

## 2020-08-20 ENCOUNTER — LAB (OUTPATIENT)
Dept: LAB | Facility: HOSPITAL | Age: 20
End: 2020-08-20

## 2020-08-20 VITALS — BODY MASS INDEX: 30.3 KG/M2 | DIASTOLIC BLOOD PRESSURE: 61 MMHG | SYSTOLIC BLOOD PRESSURE: 104 MMHG | WEIGHT: 150 LBS

## 2020-08-20 DIAGNOSIS — O34.211 ENCOUNTER FOR MATERNAL CARE FOR LOW TRANSVERSE SCAR FROM PREVIOUS CESAREAN DELIVERY: ICD-10-CM

## 2020-08-20 DIAGNOSIS — F12.11 MILD TETRAHYDROCANNABINOL (THC) ABUSE IN EARLY REMISSION: ICD-10-CM

## 2020-08-20 DIAGNOSIS — O99.012 ANEMIA IN PREGNANCY, SECOND TRIMESTER: ICD-10-CM

## 2020-08-20 DIAGNOSIS — Z3A.36 36 WEEKS GESTATION OF PREGNANCY: Primary | ICD-10-CM

## 2020-08-20 DIAGNOSIS — O99.322 DRUG USE AFFECTING PREGNANCY IN SECOND TRIMESTER: ICD-10-CM

## 2020-08-20 DIAGNOSIS — Z34.82 SUPERVISION OF NORMAL INTRAUTERINE PREGNANCY IN MULTIGRAVIDA IN SECOND TRIMESTER: ICD-10-CM

## 2020-08-20 DIAGNOSIS — O23.42 URINARY TRACT INFECTION IN MOTHER DURING SECOND TRIMESTER OF PREGNANCY: ICD-10-CM

## 2020-08-20 PROCEDURE — 99213 OFFICE O/P EST LOW 20 MIN: CPT | Performed by: OBSTETRICS & GYNECOLOGY

## 2020-08-20 PROCEDURE — 87653 STREP B DNA AMP PROBE: CPT | Performed by: OBSTETRICS & GYNECOLOGY

## 2020-08-20 NOTE — PROGRESS NOTES
CC: Prenatal visit    Carol Guzmán is a 20 y.o.  at 36w3d.  Doing well.  Denies contractions, LOF, or VB.  Reports good FM.    /61   Wt 68 kg (150 lb)   LMP  (LMP Unknown)   BMI 30.30 kg/m²   GBS collected today  Fundal Height (cm): 36 cm  Fetal Heart Rate: 130    Pregnancy Problems (from 20 to present)     Problem Noted Resolved    Urinary tract infection in mother during second trimester of pregnancy 2020 by Juliane Mcrae APRN No    Overview Signed 2020  3:39 PM by Juliane Mcrae APRN     Needs GOYO in 2 weeks         Hydronephrosis determined by ultrasound 2020 by Juliane Mcrae APRN No    Overview Signed 2020  3:40 PM by Juliane Mcrae APRN     Dx at [facility] on . Pain is improving          Drug use affecting pregnancy in second trimester 3/2/2020 by Fransisca Francois APRN No    Overview Signed 3/2/2020  2:13 PM by Fransisca Francois APRN     +THC         Anemia in pregnancy, second trimester 3/2/2020 by Fransisca Francois APRN No    Overview Signed 3/2/2020  3:32 PM by Fransisca Francois APRN     Ferrous sulfate supplementation         Supervision of normal intrauterine pregnancy in multigravida in second trimester 2020 by Fransisca Francois APRN No    Overview Signed 3/2/2020  2:19 PM by Fransisca Francois APRN      A pos / Rubella Immune / GBS @ 35-36 wks  Dating: by CRL  Flu: Declined  Genetics: Declined  Anatomy: 20 weeks  Glucola: 28 wks  Tdap: 28 wks  Lab Results   Component Value Date    WBC 8.79 2020    HGB 10.5 (L) 2020    HCT 31.2 (L) 2020    MCV 90.7 2020     2020              Encounter for maternal care for low transverse scar from previous  delivery 2020 by Fransisca Francois APRN No    Overview Addendum 3/30/2020  2:21 PM by Cook, Juliane W, APRN     x1 for fetal intolerance  Desires a repeat C/S               A/P: Carol Guzmán is a 20 y.o.  at 36w3d.  Doing well  with appropriately progressing pregnancy at this time.  Return to see me in 2 weeks, sooner as needed.  Bleeding precautions given, fetal kick count and labor precautions given.  We will do H&P at next visit in preparation for repeat  on .  Patient to continue taking Macrobid.  Return sooner as needed.  - RTC in 2 weeks  - Reviewed COVID-19 visitation policy  - Reviewed COVID-19 precautions     Diagnosis Plan   1. 36 weeks gestation of pregnancy  Group B Strep (Molecular) - Swab, Vaginal/Rectum   2. Supervision of normal intrauterine pregnancy in multigravida in second trimester     3. Encounter for maternal care for low transverse scar from previous  delivery     4. Drug use affecting pregnancy in second trimester     5. Anemia in pregnancy, second trimester     6. Mild tetrahydrocannabinol (THC) abuse in early remission     7. Urinary tract infection in mother during second trimester of pregnancy       Juan Chen DO  2020  09:21

## 2020-08-21 ENCOUNTER — TELEPHONE (OUTPATIENT)
Dept: ONCOLOGY | Facility: HOSPITAL | Age: 20
End: 2020-08-21

## 2020-08-21 ENCOUNTER — PROCEDURE VISIT (OUTPATIENT)
Dept: ONCOLOGY | Facility: HOSPITAL | Age: 20
End: 2020-08-21

## 2020-08-21 DIAGNOSIS — O99.012 ANEMIA IN PREGNANCY, SECOND TRIMESTER: ICD-10-CM

## 2020-08-21 LAB
BASOPHILS # BLD AUTO: 0.05 10*3/MM3 (ref 0–0.2)
BASOPHILS NFR BLD AUTO: 0.4 % (ref 0–1.5)
DEPRECATED RDW RBC AUTO: 48.2 FL (ref 37–54)
EOSINOPHIL # BLD AUTO: 0.19 10*3/MM3 (ref 0–0.4)
EOSINOPHIL NFR BLD AUTO: 1.7 % (ref 0.3–6.2)
ERYTHROCYTE [DISTWIDTH] IN BLOOD BY AUTOMATED COUNT: 13.7 % (ref 12.3–15.4)
FERRITIN SERPL-MCNC: 192.6 NG/ML (ref 13–150)
FOLATE SERPL-MCNC: >20 NG/ML (ref 4.78–24.2)
HCT VFR BLD AUTO: 26.9 % (ref 34–46.6)
HGB BLD-MCNC: 9.1 G/DL (ref 12–15.9)
IMM GRANULOCYTES # BLD AUTO: 0.34 10*3/MM3 (ref 0–0.05)
IMM GRANULOCYTES NFR BLD AUTO: 3 % (ref 0–0.5)
IRON 24H UR-MRATE: 132 MCG/DL (ref 37–145)
IRON SATN MFR SERPL: 37 % (ref 20–50)
LYMPHOCYTES # BLD AUTO: 1.61 10*3/MM3 (ref 0.7–3.1)
LYMPHOCYTES NFR BLD AUTO: 14.2 % (ref 19.6–45.3)
MCH RBC QN AUTO: 32.4 PG (ref 26.6–33)
MCHC RBC AUTO-ENTMCNC: 33.8 G/DL (ref 31.5–35.7)
MCV RBC AUTO: 95.7 FL (ref 79–97)
MONOCYTES # BLD AUTO: 0.53 10*3/MM3 (ref 0.1–0.9)
MONOCYTES NFR BLD AUTO: 4.7 % (ref 5–12)
NEUTROPHILS NFR BLD AUTO: 76 % (ref 42.7–76)
NEUTROPHILS NFR BLD AUTO: 8.64 10*3/MM3 (ref 1.7–7)
NRBC BLD AUTO-RTO: 0 /100 WBC (ref 0–0.2)
PLATELET # BLD AUTO: 177 10*3/MM3 (ref 140–450)
PMV BLD AUTO: 8.5 FL (ref 6–12)
RBC # BLD AUTO: 2.81 10*6/MM3 (ref 3.77–5.28)
TIBC SERPL-MCNC: 355 MCG/DL (ref 298–536)
TRANSFERRIN SERPL-MCNC: 238 MG/DL (ref 200–360)
WBC # BLD AUTO: 11.36 10*3/MM3 (ref 3.4–10.8)

## 2020-08-21 PROCEDURE — 85025 COMPLETE CBC W/AUTO DIFF WBC: CPT | Performed by: INTERNAL MEDICINE

## 2020-08-21 PROCEDURE — 82746 ASSAY OF FOLIC ACID SERUM: CPT | Performed by: INTERNAL MEDICINE

## 2020-08-21 PROCEDURE — 84466 ASSAY OF TRANSFERRIN: CPT | Performed by: INTERNAL MEDICINE

## 2020-08-21 PROCEDURE — 36415 COLL VENOUS BLD VENIPUNCTURE: CPT | Performed by: INTERNAL MEDICINE

## 2020-08-21 PROCEDURE — 82728 ASSAY OF FERRITIN: CPT | Performed by: INTERNAL MEDICINE

## 2020-08-21 PROCEDURE — 83540 ASSAY OF IRON: CPT | Performed by: INTERNAL MEDICINE

## 2020-08-21 NOTE — TELEPHONE ENCOUNTER
----- Message from Susan Murphy MD sent at 8/21/2020  1:09 PM CDT -----  Iron level is normal. Continue monitoring.

## 2020-08-22 LAB — GROUP B STREP, DNA: NEGATIVE

## 2020-08-28 ENCOUNTER — HOSPITAL ENCOUNTER (OUTPATIENT)
Facility: HOSPITAL | Age: 20
Discharge: HOME | End: 2020-08-28
Attending: OBSTETRICS & GYNECOLOGY | Admitting: OBSTETRICS & GYNECOLOGY

## 2020-08-28 VITALS
RESPIRATION RATE: 16 BRPM | HEART RATE: 93 BPM | SYSTOLIC BLOOD PRESSURE: 122 MMHG | OXYGEN SATURATION: 95 % | TEMPERATURE: 98.9 F | DIASTOLIC BLOOD PRESSURE: 58 MMHG

## 2020-08-28 LAB
BACTERIA UR QL AUTO: ABNORMAL /HPF
BILIRUB UR QL STRIP: NEGATIVE
CANDIDA ALBICANS: POSITIVE
CLARITY UR: ABNORMAL
COLOR UR: YELLOW
GARDNERELLA VAGINALIS: NEGATIVE
GLUCOSE UR STRIP-MCNC: NEGATIVE MG/DL
HGB UR QL STRIP.AUTO: ABNORMAL
HYALINE CASTS UR QL AUTO: ABNORMAL /LPF
KETONES UR QL STRIP: NEGATIVE
LEUKOCYTE ESTERASE UR QL STRIP.AUTO: ABNORMAL
NITRITE UR QL STRIP: NEGATIVE
PH UR STRIP.AUTO: 6 [PH] (ref 5–9)
PROT UR QL STRIP: ABNORMAL
RBC # UR: ABNORMAL /HPF
REF LAB TEST METHOD: ABNORMAL
SP GR UR STRIP: 1.01 (ref 1–1.03)
SQUAMOUS #/AREA URNS HPF: ABNORMAL /HPF
T VAGINALIS DNA VAG QL PROBE+SIG AMP: NEGATIVE
UROBILINOGEN UR QL STRIP: ABNORMAL
WBC UR QL AUTO: ABNORMAL /HPF

## 2020-08-28 PROCEDURE — 81001 URINALYSIS AUTO W/SCOPE: CPT | Performed by: OBSTETRICS & GYNECOLOGY

## 2020-08-28 PROCEDURE — 25010000002 CEFTRIAXONE PER 250 MG: Performed by: OBSTETRICS & GYNECOLOGY

## 2020-08-28 PROCEDURE — 87660 TRICHOMONAS VAGIN DIR PROBE: CPT | Performed by: OBSTETRICS & GYNECOLOGY

## 2020-08-28 PROCEDURE — 87480 CANDIDA DNA DIR PROBE: CPT | Performed by: OBSTETRICS & GYNECOLOGY

## 2020-08-28 PROCEDURE — G0463 HOSPITAL OUTPT CLINIC VISIT: HCPCS

## 2020-08-28 PROCEDURE — 59025 FETAL NON-STRESS TEST: CPT

## 2020-08-28 PROCEDURE — 96372 THER/PROPH/DIAG INJ SC/IM: CPT

## 2020-08-28 PROCEDURE — 59025 FETAL NON-STRESS TEST: CPT | Performed by: OBSTETRICS & GYNECOLOGY

## 2020-08-28 PROCEDURE — 87510 GARDNER VAG DNA DIR PROBE: CPT | Performed by: OBSTETRICS & GYNECOLOGY

## 2020-08-28 RX ORDER — CEPHALEXIN 500 MG/1
500 CAPSULE ORAL 4 TIMES DAILY
Qty: 40 CAPSULE | Refills: 0 | Status: SHIPPED | OUTPATIENT
Start: 2020-08-28 | End: 2020-09-11 | Stop reason: HOSPADM

## 2020-08-28 RX ORDER — FLUCONAZOLE 150 MG/1
150 TABLET ORAL ONCE
Status: COMPLETED | OUTPATIENT
Start: 2020-08-28 | End: 2020-08-28

## 2020-08-28 RX ADMIN — FLUCONAZOLE 150 MG: 150 TABLET ORAL at 03:33

## 2020-08-28 RX ADMIN — CEFTRIAXONE SODIUM 1 G: 1 INJECTION, POWDER, FOR SOLUTION INTRAMUSCULAR; INTRAVENOUS at 03:34

## 2020-09-03 ENCOUNTER — ROUTINE PRENATAL (OUTPATIENT)
Dept: OBSTETRICS AND GYNECOLOGY | Facility: CLINIC | Age: 20
End: 2020-09-03

## 2020-09-03 VITALS — WEIGHT: 152 LBS | DIASTOLIC BLOOD PRESSURE: 68 MMHG | SYSTOLIC BLOOD PRESSURE: 110 MMHG | BODY MASS INDEX: 30.7 KG/M2

## 2020-09-03 DIAGNOSIS — O99.012 ANEMIA IN PREGNANCY, SECOND TRIMESTER: ICD-10-CM

## 2020-09-03 DIAGNOSIS — Z34.82 SUPERVISION OF NORMAL INTRAUTERINE PREGNANCY IN MULTIGRAVIDA IN SECOND TRIMESTER: ICD-10-CM

## 2020-09-03 DIAGNOSIS — O23.42 URINARY TRACT INFECTION IN MOTHER DURING SECOND TRIMESTER OF PREGNANCY: ICD-10-CM

## 2020-09-03 DIAGNOSIS — O99.322 DRUG USE AFFECTING PREGNANCY IN SECOND TRIMESTER: ICD-10-CM

## 2020-09-03 DIAGNOSIS — O34.211 ENCOUNTER FOR MATERNAL CARE FOR LOW TRANSVERSE SCAR FROM PREVIOUS CESAREAN DELIVERY: ICD-10-CM

## 2020-09-03 DIAGNOSIS — N13.30 HYDRONEPHROSIS DETERMINED BY ULTRASOUND: ICD-10-CM

## 2020-09-03 DIAGNOSIS — Z3A.38 38 WEEKS GESTATION OF PREGNANCY: Primary | ICD-10-CM

## 2020-09-03 PROCEDURE — 99213 OFFICE O/P EST LOW 20 MIN: CPT | Performed by: OBSTETRICS & GYNECOLOGY

## 2020-09-03 RX ORDER — OXYTOCIN 10 [USP'U]/ML
85 INJECTION, SOLUTION INTRAMUSCULAR; INTRAVENOUS ONCE
Status: CANCELLED | OUTPATIENT
Start: 2020-09-03

## 2020-09-03 RX ORDER — METHYLERGONOVINE MALEATE 0.2 MG/ML
200 INJECTION INTRAVENOUS ONCE AS NEEDED
Status: CANCELLED | OUTPATIENT
Start: 2020-09-03

## 2020-09-03 RX ORDER — OXYTOCIN 10 [USP'U]/ML
650 INJECTION, SOLUTION INTRAMUSCULAR; INTRAVENOUS ONCE
Status: CANCELLED | OUTPATIENT
Start: 2020-09-03

## 2020-09-03 RX ORDER — ONDANSETRON 4 MG/1
4 TABLET, FILM COATED ORAL EVERY 6 HOURS PRN
Status: CANCELLED | OUTPATIENT
Start: 2020-09-03

## 2020-09-03 RX ORDER — SODIUM CHLORIDE 0.9 % (FLUSH) 0.9 %
3 SYRINGE (ML) INJECTION EVERY 12 HOURS SCHEDULED
Status: CANCELLED | OUTPATIENT
Start: 2020-09-03

## 2020-09-03 RX ORDER — TRISODIUM CITRATE DIHYDRATE AND CITRIC ACID MONOHYDRATE 500; 334 MG/5ML; MG/5ML
30 SOLUTION ORAL ONCE
Status: CANCELLED | OUTPATIENT
Start: 2020-09-03 | End: 2020-09-03

## 2020-09-03 RX ORDER — MISOPROSTOL 100 UG/1
800 TABLET ORAL AS NEEDED
Status: CANCELLED | OUTPATIENT
Start: 2020-09-03

## 2020-09-03 RX ORDER — PROMETHAZINE HYDROCHLORIDE 25 MG/1
12.5 TABLET ORAL EVERY 6 HOURS PRN
Status: CANCELLED | OUTPATIENT
Start: 2020-09-03

## 2020-09-03 RX ORDER — CARBOPROST TROMETHAMINE 250 UG/ML
250 INJECTION, SOLUTION INTRAMUSCULAR AS NEEDED
Status: CANCELLED | OUTPATIENT
Start: 2020-09-03

## 2020-09-03 RX ORDER — SODIUM CHLORIDE 0.9 % (FLUSH) 0.9 %
3-10 SYRINGE (ML) INJECTION AS NEEDED
Status: CANCELLED | OUTPATIENT
Start: 2020-09-03

## 2020-09-03 RX ORDER — ONDANSETRON 2 MG/ML
4 INJECTION INTRAMUSCULAR; INTRAVENOUS EVERY 6 HOURS PRN
Status: CANCELLED | OUTPATIENT
Start: 2020-09-03

## 2020-09-03 RX ORDER — PROMETHAZINE HYDROCHLORIDE 25 MG/1
12.5 SUPPOSITORY RECTAL EVERY 6 HOURS PRN
Status: CANCELLED | OUTPATIENT
Start: 2020-09-03

## 2020-09-03 RX ORDER — LIDOCAINE HYDROCHLORIDE 10 MG/ML
5 INJECTION, SOLUTION EPIDURAL; INFILTRATION; INTRACAUDAL; PERINEURAL AS NEEDED
Status: CANCELLED | OUTPATIENT
Start: 2020-09-03

## 2020-09-03 NOTE — H&P (VIEW-ONLY)
Obstetric History and Physical    Chief Complaint   Patient presents with   • Routine Prenatal Visit           Patient is a 20 y.o. female  currently at 38w3d, who presents for routine OB visit.  States positive fetal movement denies loss of fluid or vaginal bleeding is having some irregular contractions.  Patient is scheduled to undergo repeat  section on .  No concerns or complaints at this time.    The risks, benefits. and alternatives to  section were discussed.  The risks that were discussed included, but were not limited to, pain, infection, bleeding, hemorrhage,  injury to the bowel, bladder, uterus, tubes, ovaries, or baby which could require further surgery or prolonged hospitalization.  Remote possibility of hysterectomy and/or salpingo-oophorectomy. Remote possibility of death of baby and/or mother. I discussed the risk of bleeding with the patient and possible risk of blood transfusion.  Blood products carry risk of HIV, infection, hepatitis, and transfusion reaction. Patient is willing to accept blood products. The patient understands that SCD's will be placed on her legs to try and reduce the risk of clot formation in her legs.  She understands that we will have early ambulation to also reduce the risk of blood clot formation and to reduce the risk of pneumonia.  She will be given antibiotics prior to her surgery to help decrease the risk for infection.  All questions were answered. Patient express understanding and desires to proceed with surgery.    Her prenatal care is complicated by pyelonephritis earlier in pregnancy, history of drug use, GBS positive in urine, and history of previous  section.     Obstetric History   #: 1, Date: 17, Sex: Female, Weight: 2760 g (6 lb 1.4 oz), GA: 40w0d, Delivery: , Low Transverse, Apgar1: 8, Apgar5: 9, Living: Living, Birth Comments: None    #: 2, Date: None, Sex: None, Weight: None, GA: None, Delivery:  None, Apgar1: None, Apgar5: None, Living: None, Birth Comments: None       The following portions of the patients history were reviewed and updated as appropriate: current medications, allergies, past medical history, past surgical history, past family history, past social history and problem list .       Prenatal Information:  Prenatal Results     POC Urine Glucose/Protein     Test Value Reference Range Date Time    Urine Glucose Negative mg/dL Negative, 1000 mg/dL (3+) 02/20/17 1516    Urine Protein 100 mg/dL mg/dL Negative 02/20/17 1516          Initial Prenatal Labs     Test Value Reference Range Date Time    Hemoglobin 10.5 g/dL 12.0 - 15.9 02/17/20 1401    Hematocrit 31.2 % 34.0 - 46.6 02/17/20 1401    Platelets 177 10*3/mm3 140 - 450 08/21/20 1226      188 10*3/mm3 140 - 450 07/10/20 1048      198 10*3/mm3 140 - 450 05/28/20 1121      283 10*3/mm3 140 - 450 02/17/20 1401    Rubella IgG Positive   02/17/20 1401    Hepatitis B SAg Non-Reactive  Non-Reactive 02/17/20 1401    Hepatitis C Ab Non-Reactive  Non-Reactive 02/17/20 1401    RPR Non-Reactive  Non-Reactive 02/17/20 1401    ABO A   02/17/20 1401    Rh Positive   02/17/20 1401    Antibody Screen Negative   02/17/20 1401    HIV Non-Reactive  Non-Reactive 02/17/20 1401    Urine Culture 25,000 CFU/mL Streptococcus agalactiae (Group B)   02/17/20 1328      <25,000 CFU/mL Mixed Yasmine Isolated   02/17/20 1328      25,000 CFU/mL Streptococcus agalactiae (Group B)   02/17/20 1327    Gonorrhea Negative  Negative 02/17/20 1328    Chlamydia Negative  Negative 02/17/20 1328    TSH              2nd and 3rd Trimester     Test Value Reference Range Date Time    Hemoglobin (repeated) 9.1 g/dL 12.0 - 15.9 08/21/20 1226      7.9 g/dL 12.0 - 15.9 07/10/20 1048      7.2 g/dL 12.0 - 15.9 05/28/20 1121    Hematocrit (repeated) 26.9 % 34.0 - 46.6 08/21/20 1226      23.4 % 34.0 - 46.6 07/10/20 1048      20.9 % 34.0 - 46.6 05/28/20 1121     mg/dL 74 - 180 06/12/20 0907       187 mg/dL 60 - 140 05/28/20 1121    Antibody Screen (repeated)        GTT Fasting        GTT 1 Hr        GTT 2 Hr        GTT 3 Hr        Group B Strep Negative  Negative 08/20/20 0912          Drug Screening     Test Value Reference Range Date Time    Amphetamine Screen Negative  Negative 05/28/20 1018      Negative  Negative 02/17/20 1327    Barbiturate Screen Negative  Negative 05/28/20 1018      Negative  Negative 02/17/20 1327    Benzodiazepine Screen Negative  Negative 05/28/20 1018      Negative  Negative 02/17/20 1327    Methadone Screen Negative  Negative 05/28/20 1018      Negative  Negative 02/17/20 1327    Phencyclidine Screen Negative  Negative 05/28/20 1018      Negative  Negative 02/17/20 1327    Opiates Screen Negative  Negative 05/28/20 1018      Negative  Negative 02/17/20 1327    THC Screen Negative  Negative 05/28/20 1018      Positive  Negative 02/17/20 1327    Cocaine Screen Negative  Negative 05/28/20 1018      Negative  Negative 02/17/20 1327    Propoxyphene Screen Negative  Negative 05/28/20 1018      Negative  Negative 02/17/20 1327    Buprenorphine Screen Negative  Negative 05/28/20 1018      Negative  Negative 02/17/20 1327    Methamphetamine Screen Negative  Negative 05/28/20 1018      Negative  Negative 02/17/20 1327    Oxycodone Screen Negative  Negative 05/28/20 1018      Negative  Negative 02/17/20 1327    Tricyclic Antidepressants Screen Negative  Negative 05/28/20 1018      Negative  Negative 02/17/20 1327          Other (Risk screening)     Test Value Reference Range Date Time    Varicella IgG        Parvovirus IgG        CMV IgG        Cystic Fibrosis        Hemoglobin electrophoresis        NIPT        MSAFP-4        AFP (for NTD only)                  External Prenatal Results     Pregnancy Outside Results - Transcribed From Office Records - See Scanned Records For Details     Test Value Date Time    Hgb 9.1 g/dL 08/21/20 1226      7.9 g/dL 07/10/20 1048      7.2 g/dL 05/28/20  1121      10.5 g/dL 02/17/20 1401    Hct 26.9 % 08/21/20 1226      23.4 % 07/10/20 1048      20.9 % 05/28/20 1121      31.2 % 02/17/20 1401    ABO A  02/17/20 1401    Rh Positive  02/17/20 1401    Antibody Screen Negative  02/17/20 1401    Glucose Fasting GTT       Glucose Tolerance Test 1 hour       Glucose Tolerance Test 3 hour       Gonorrhea (discrete) Negative  02/17/20 1328    Chlamydia (discrete) Negative  02/17/20 1328    RPR Non-Reactive  02/17/20 1401    VDRL       Syphilis Antibody       Rubella Positive  02/17/20 1401    HBsAg Non-Reactive  02/17/20 1401    Herpes Simplex Virus PCR       Herpes Simplex VIrus Culture       HIV Non-Reactive  02/17/20 1401    Hep C RNA Quant PCR       Hep C Antibody Non-Reactive  02/17/20 1401    AFP 58.0 ng/mL 07/14/17 1049    Group B Strep Negative  08/20/20 0912    GBS Susceptibility to Clindamycin       GBS Susceptibility to Erythromycin       Fetal Fibronectin       Genetic Testing, Maternal Blood             Drug Screening     Test Value Date Time    Urine Drug Screen       Amphetamine Screen Negative  05/28/20 1018      Negative  02/17/20 1327    Barbiturate Screen Negative  05/28/20 1018      Negative  02/17/20 1327    Benzodiazepine Screen Negative  05/28/20 1018      Negative  02/17/20 1327    Methadone Screen Negative  05/28/20 1018      Negative  02/17/20 1327    Phencyclidine Screen Negative  05/28/20 1018      Negative  02/17/20 1327    Opiates Screen Negative  05/28/20 1018      Negative  02/17/20 1327    THC Screen Negative  05/28/20 1018      Positive  02/17/20 1327    Cocaine Screen       Propoxyphene Screen Negative  05/28/20 1018      Negative  02/17/20 1327    Buprenorphine Screen Negative  05/28/20 1018      Negative  02/17/20 1327    Methamphetamine Screen       Oxycodone Screen Negative  05/28/20 1018      Negative  02/17/20 1327    Tricyclic Antidepressants Screen Negative  05/28/20 1018      Negative  02/17/20 1327                 Past OB  History:     OB History    Para Term  AB Living   2 1 1 0 0 1   SAB TAB Ectopic Molar Multiple Live Births   0 0 0 0 0 1      # Outcome Date GA Lbr Patricio/2nd Weight Sex Delivery Anes PTL Lv   2 Current            1 Term 17 40w0d  2760 g (6 lb 1.4 oz) F CS-LTranv EPI N FRAN      Complications: Fetal Intolerance      Name: VIDA PELLETIER      Apgar1: 8  Apgar5: 9        ALLERGIES:     Allergies   Allergen Reactions   • Percocet [Oxycodone-Acetaminophen] Hives        Home Medications:     Prior to Admission medications    Medication Sig Start Date End Date Taking? Authorizing Provider   cephalexin (Keflex) 500 MG capsule Take 1 capsule by mouth 4 (Four) Times a Day for 10 days. 20 Yes Juan Chen DO   Cyanocobalamin (B-12 Compliance Injection) 1000 MCG/ML kit Inject 1 each as directed Take As Directed. Daily for 7 days, weekly for 4 weeks then monthly. 20  Yes Susan Murphy MD   docusate sodium (COLACE) 100 MG capsule Take 100 mg by mouth Daily. 20  Yes Derrick Andrews, DO   ferrous sulfate 325 (65 FE) MG tablet Take 325 mg by mouth 2 (Two) Times a Day. 20  Yes Derrick Andrews, DO   folic acid (FOLVITE) 1 MG tablet Take 1 tablet by mouth Daily. 20  Yes Susan Murphy MD   Needles & Syringes misc 1 application Take As Directed. 20  Yes Susan Murphy MD   omeprazole OTC (PrilOSEC OTC) 20 MG EC tablet Take 1 tablet by mouth Daily. Take daily 30 minutes prior to first morning meal 20 Yes Juan Chen DO   Prenatal Vit-Fe Fumarate-FA (PRENATAL, CLASSIC, VITAMIN) 28-0.8 MG tablet tablet Take  by mouth Daily.   Yes Provider, MD Ortega       Past Medical History: Past Medical History:   Diagnosis Date   • Acute pharyngitis, unspecified    • Allergic    • Allergic rhinitis    • Anemia in pregnancy, second trimester 3/2/2020   • Asthma    • Common cold    • Cough    • Heart murmur    • History of ear  infections    • Mild tetrahydrocannabinol (THC) abuse in early remission 3/30/2020   • Otitis media     Serous     • Upper respiratory infection    • Urinary tract infection       Past Surgical History Past Surgical History:   Procedure Laterality Date   •  SECTION N/A 2017    Procedure:  SECTION PRIMARY;  Surgeon: Bird Munson MD;  Location: [facility] LABOR DELIVERY;  Service:       Family History: Family History   Problem Relation Age of Onset   • No Known Problems Father    • No Known Problems Mother    • No Known Problems Sister       Social History:  reports that she has never smoked. She has never used smokeless tobacco.   reports that she does not drink alcohol.   reports that she has current or past drug history. Drug: Marijuana.        Review of Systems                                                                                                                  Neuro no history of brain tumor    HENT no history of ear tumors    Eye no history of retinal tumors    Pulmonary no history of lung tumors    Cardiac no history of cardiac tumors    GI: No history of small bowel tumors    Musculoskeletal: No history of skeletal muscle tumors    Endocrine: No history of adrenal tumors    Lymphatic: No history of Hodgkin's disease    Renal: No history of renal cancer      Objective     Documented Vitals    20 0910   BP: 110/68   Weight: 68.9 kg (152 lb)       OBGyn Exam  Constitutional: Appears to be in no acute distress; Eyes: sclera normal; Endocrine system: thyroid palpate is normal; Pulmonary system: lungs clear; Cardiovascular system: heart regular rate and rhythm; Gastrointestinal system: abdomen soft nontender, active bowel sounds; Urologic system: CVA negative; Psychiatric: appropriate insight; Neurologic: gait within normal limits, cephalic, fetal heart rate 135 beats a minute, fundal height 38 cm, cervix 1/50/-3      Last Labs  Lab Results   Component Value Date    WBC 11.36  (H) 2020    RBC 2.81 (L) 2020    HGB 9.1 (L) 2020    HCT 26.9 (L) 2020    MCV 95.7 2020    MCH 32.4 2020    MCHC 33.8 2020    RDW 13.7 2020    RDWSD 48.2 2020    MPV 8.5 2020     2020        No results found for: GLUCOSE, BUN, CREATININE, NA, K, CL, CO2, CALCIUM, PROTEINTOT, ALBUMIN, ALT, AST, ALKPHOS, BILITOT, EGFRIFNONA, GLOB, AGRATIO, BCR, ANIONGAP, BILIDIR, INDBILI    No results found for: HCGQUAL      Assessment/Plan:  1. 20 y.o. [id number].  Doing well with appropriately progressing pregnancy.  Patient stable at this time.  Fetal assessment reassuring today.  Patient undergo repeat  on .  Return to see me if needed if any concerns with baby arises.  Anticipate routine  and routine postoperative and postpartum care.      Carol Guzmán and I have discussed pain goals for this hospitalization after reviewing her current clinical condition, medical history and prior pain experiences.  The goal is to keep her pain level tolerable.  To help achieve this, I plan to use IV and oral pain medications to control postoperative pain..           This document has been electronically signed by Juan Chen DO on September 3, 2020 09:19

## 2020-09-03 NOTE — PROGRESS NOTES
Obstetric History and Physical    Chief Complaint   Patient presents with   • Routine Prenatal Visit           Patient is a 20 y.o. female  currently at 38w3d, who presents for routine OB visit.  States positive fetal movement denies loss of fluid or vaginal bleeding is having some irregular contractions.  Patient is scheduled to undergo repeat  section on .  No concerns or complaints at this time.    The risks, benefits. and alternatives to  section were discussed.  The risks that were discussed included, but were not limited to, pain, infection, bleeding, hemorrhage,  injury to the bowel, bladder, uterus, tubes, ovaries, or baby which could require further surgery or prolonged hospitalization.  Remote possibility of hysterectomy and/or salpingo-oophorectomy. Remote possibility of death of baby and/or mother. I discussed the risk of bleeding with the patient and possible risk of blood transfusion.  Blood products carry risk of HIV, infection, hepatitis, and transfusion reaction. Patient is willing to accept blood products. The patient understands that SCD's will be placed on her legs to try and reduce the risk of clot formation in her legs.  She understands that we will have early ambulation to also reduce the risk of blood clot formation and to reduce the risk of pneumonia.  She will be given antibiotics prior to her surgery to help decrease the risk for infection.  All questions were answered. Patient express understanding and desires to proceed with surgery.    Her prenatal care is complicated by pyelonephritis earlier in pregnancy, history of drug use, GBS positive in urine, and history of previous  section.     Obstetric History   #: 1, Date: 17, Sex: Female, Weight: 2760 g (6 lb 1.4 oz), GA: 40w0d, Delivery: , Low Transverse, Apgar1: 8, Apgar5: 9, Living: Living, Birth Comments: None    #: 2, Date: None, Sex: None, Weight: None, GA: None, Delivery:  None, Apgar1: None, Apgar5: None, Living: None, Birth Comments: None       The following portions of the patients history were reviewed and updated as appropriate: current medications, allergies, past medical history, past surgical history, past family history, past social history and problem list .       Prenatal Information:  Prenatal Results     POC Urine Glucose/Protein     Test Value Reference Range Date Time    Urine Glucose Negative mg/dL Negative, 1000 mg/dL (3+) 02/20/17 1516    Urine Protein 100 mg/dL mg/dL Negative 02/20/17 1516          Initial Prenatal Labs     Test Value Reference Range Date Time    Hemoglobin 10.5 g/dL 12.0 - 15.9 02/17/20 1401    Hematocrit 31.2 % 34.0 - 46.6 02/17/20 1401    Platelets 177 10*3/mm3 140 - 450 08/21/20 1226      188 10*3/mm3 140 - 450 07/10/20 1048      198 10*3/mm3 140 - 450 05/28/20 1121      283 10*3/mm3 140 - 450 02/17/20 1401    Rubella IgG Positive   02/17/20 1401    Hepatitis B SAg Non-Reactive  Non-Reactive 02/17/20 1401    Hepatitis C Ab Non-Reactive  Non-Reactive 02/17/20 1401    RPR Non-Reactive  Non-Reactive 02/17/20 1401    ABO A   02/17/20 1401    Rh Positive   02/17/20 1401    Antibody Screen Negative   02/17/20 1401    HIV Non-Reactive  Non-Reactive 02/17/20 1401    Urine Culture 25,000 CFU/mL Streptococcus agalactiae (Group B)   02/17/20 1328      <25,000 CFU/mL Mixed Yasmine Isolated   02/17/20 1328      25,000 CFU/mL Streptococcus agalactiae (Group B)   02/17/20 1327    Gonorrhea Negative  Negative 02/17/20 1328    Chlamydia Negative  Negative 02/17/20 1328    TSH              2nd and 3rd Trimester     Test Value Reference Range Date Time    Hemoglobin (repeated) 9.1 g/dL 12.0 - 15.9 08/21/20 1226      7.9 g/dL 12.0 - 15.9 07/10/20 1048      7.2 g/dL 12.0 - 15.9 05/28/20 1121    Hematocrit (repeated) 26.9 % 34.0 - 46.6 08/21/20 1226      23.4 % 34.0 - 46.6 07/10/20 1048      20.9 % 34.0 - 46.6 05/28/20 1121     mg/dL 74 - 180 06/12/20 0907       187 mg/dL 60 - 140 05/28/20 1121    Antibody Screen (repeated)        GTT Fasting        GTT 1 Hr        GTT 2 Hr        GTT 3 Hr        Group B Strep Negative  Negative 08/20/20 0912          Drug Screening     Test Value Reference Range Date Time    Amphetamine Screen Negative  Negative 05/28/20 1018      Negative  Negative 02/17/20 1327    Barbiturate Screen Negative  Negative 05/28/20 1018      Negative  Negative 02/17/20 1327    Benzodiazepine Screen Negative  Negative 05/28/20 1018      Negative  Negative 02/17/20 1327    Methadone Screen Negative  Negative 05/28/20 1018      Negative  Negative 02/17/20 1327    Phencyclidine Screen Negative  Negative 05/28/20 1018      Negative  Negative 02/17/20 1327    Opiates Screen Negative  Negative 05/28/20 1018      Negative  Negative 02/17/20 1327    THC Screen Negative  Negative 05/28/20 1018      Positive  Negative 02/17/20 1327    Cocaine Screen Negative  Negative 05/28/20 1018      Negative  Negative 02/17/20 1327    Propoxyphene Screen Negative  Negative 05/28/20 1018      Negative  Negative 02/17/20 1327    Buprenorphine Screen Negative  Negative 05/28/20 1018      Negative  Negative 02/17/20 1327    Methamphetamine Screen Negative  Negative 05/28/20 1018      Negative  Negative 02/17/20 1327    Oxycodone Screen Negative  Negative 05/28/20 1018      Negative  Negative 02/17/20 1327    Tricyclic Antidepressants Screen Negative  Negative 05/28/20 1018      Negative  Negative 02/17/20 1327          Other (Risk screening)     Test Value Reference Range Date Time    Varicella IgG        Parvovirus IgG        CMV IgG        Cystic Fibrosis        Hemoglobin electrophoresis        NIPT        MSAFP-4        AFP (for NTD only)                  External Prenatal Results     Pregnancy Outside Results - Transcribed From Office Records - See Scanned Records For Details     Test Value Date Time    Hgb 9.1 g/dL 08/21/20 1226      7.9 g/dL 07/10/20 1048      7.2 g/dL 05/28/20  1121      10.5 g/dL 02/17/20 1401    Hct 26.9 % 08/21/20 1226      23.4 % 07/10/20 1048      20.9 % 05/28/20 1121      31.2 % 02/17/20 1401    ABO A  02/17/20 1401    Rh Positive  02/17/20 1401    Antibody Screen Negative  02/17/20 1401    Glucose Fasting GTT       Glucose Tolerance Test 1 hour       Glucose Tolerance Test 3 hour       Gonorrhea (discrete) Negative  02/17/20 1328    Chlamydia (discrete) Negative  02/17/20 1328    RPR Non-Reactive  02/17/20 1401    VDRL       Syphilis Antibody       Rubella Positive  02/17/20 1401    HBsAg Non-Reactive  02/17/20 1401    Herpes Simplex Virus PCR       Herpes Simplex VIrus Culture       HIV Non-Reactive  02/17/20 1401    Hep C RNA Quant PCR       Hep C Antibody Non-Reactive  02/17/20 1401    AFP 58.0 ng/mL 07/14/17 1049    Group B Strep Negative  08/20/20 0912    GBS Susceptibility to Clindamycin       GBS Susceptibility to Erythromycin       Fetal Fibronectin       Genetic Testing, Maternal Blood             Drug Screening     Test Value Date Time    Urine Drug Screen       Amphetamine Screen Negative  05/28/20 1018      Negative  02/17/20 1327    Barbiturate Screen Negative  05/28/20 1018      Negative  02/17/20 1327    Benzodiazepine Screen Negative  05/28/20 1018      Negative  02/17/20 1327    Methadone Screen Negative  05/28/20 1018      Negative  02/17/20 1327    Phencyclidine Screen Negative  05/28/20 1018      Negative  02/17/20 1327    Opiates Screen Negative  05/28/20 1018      Negative  02/17/20 1327    THC Screen Negative  05/28/20 1018      Positive  02/17/20 1327    Cocaine Screen       Propoxyphene Screen Negative  05/28/20 1018      Negative  02/17/20 1327    Buprenorphine Screen Negative  05/28/20 1018      Negative  02/17/20 1327    Methamphetamine Screen       Oxycodone Screen Negative  05/28/20 1018      Negative  02/17/20 1327    Tricyclic Antidepressants Screen Negative  05/28/20 1018      Negative  02/17/20 1327                 Past OB  History:     OB History    Para Term  AB Living   2 1 1 0 0 1   SAB TAB Ectopic Molar Multiple Live Births   0 0 0 0 0 1      # Outcome Date GA Lbr Patricio/2nd Weight Sex Delivery Anes PTL Lv   2 Current            1 Term 17 40w0d  2760 g (6 lb 1.4 oz) F CS-LTranv EPI N FRAN      Complications: Fetal Intolerance      Name: VIDA PELLETIER      Apgar1: 8  Apgar5: 9        ALLERGIES:     Allergies   Allergen Reactions   • Percocet [Oxycodone-Acetaminophen] Hives        Home Medications:     Prior to Admission medications    Medication Sig Start Date End Date Taking? Authorizing Provider   cephalexin (Keflex) 500 MG capsule Take 1 capsule by mouth 4 (Four) Times a Day for 10 days. 20 Yes Juan Chen DO   Cyanocobalamin (B-12 Compliance Injection) 1000 MCG/ML kit Inject 1 each as directed Take As Directed. Daily for 7 days, weekly for 4 weeks then monthly. 20  Yes Susan Murphy MD   docusate sodium (COLACE) 100 MG capsule Take 100 mg by mouth Daily. 20  Yes Derrick Andrews, DO   ferrous sulfate 325 (65 FE) MG tablet Take 325 mg by mouth 2 (Two) Times a Day. 20  Yes Derrick Andrews, DO   folic acid (FOLVITE) 1 MG tablet Take 1 tablet by mouth Daily. 20  Yes Susan Murphy MD   Needles & Syringes misc 1 application Take As Directed. 20  Yes Susan Murphy MD   omeprazole OTC (PrilOSEC OTC) 20 MG EC tablet Take 1 tablet by mouth Daily. Take daily 30 minutes prior to first morning meal 20 Yes Juan Chen DO   Prenatal Vit-Fe Fumarate-FA (PRENATAL, CLASSIC, VITAMIN) 28-0.8 MG tablet tablet Take  by mouth Daily.   Yes Provider, MD Ortega       Past Medical History: Past Medical History:   Diagnosis Date   • Acute pharyngitis, unspecified    • Allergic    • Allergic rhinitis    • Anemia in pregnancy, second trimester 3/2/2020   • Asthma    • Common cold    • Cough    • Heart murmur    • History of ear  infections    • Mild tetrahydrocannabinol (THC) abuse in early remission 3/30/2020   • Otitis media     Serous     • Upper respiratory infection    • Urinary tract infection       Past Surgical History Past Surgical History:   Procedure Laterality Date   •  SECTION N/A 2017    Procedure:  SECTION PRIMARY;  Surgeon: Bird Munson MD;  Location: [facility] [place] LABOR DELIVERY;  Service:       Family History: Family History   Problem Relation Age of Onset   • No Known Problems Father    • No Known Problems Mother    • No Known Problems Sister       Social History:  reports that she has never smoked. She has never used smokeless tobacco.   reports that she does not drink alcohol.   reports that she has current or past drug history. Drug: Marijuana.        Review of Systems                                                                                                                  Neuro no history of brain tumor    HENT no history of ear tumors    Eye no history of retinal tumors    Pulmonary no history of lung tumors    Cardiac no history of cardiac tumors    GI: No history of small bowel tumors    Musculoskeletal: No history of skeletal muscle tumors    Endocrine: No history of adrenal tumors    Lymphatic: No history of Hodgkin's disease    Renal: No history of renal cancer      Objective     Documented Vitals    20 0910   BP: 110/68   Weight: 68.9 kg (152 lb)       OBGyn Exam  Constitutional: Appears to be in no acute distress; Eyes: sclera normal; Endocrine system: thyroid palpate is normal; Pulmonary system: lungs clear; Cardiovascular system: heart regular rate and rhythm; Gastrointestinal system: abdomen soft nontender, active bowel sounds; Urologic system: CVA negative; Psychiatric: appropriate insight; Neurologic: gait within normal limits, cephalic, fetal heart rate 135 beats a minute, fundal height 38 cm, cervix 1/50/-3      Last Labs  Lab Results   Component Value Date    WBC 11.36  (H) 2020    RBC 2.81 (L) 2020    HGB 9.1 (L) 2020    HCT 26.9 (L) 2020    MCV 95.7 2020    MCH 32.4 2020    MCHC 33.8 2020    RDW 13.7 2020    RDWSD 48.2 2020    MPV 8.5 2020     2020        No results found for: GLUCOSE, BUN, CREATININE, NA, K, CL, CO2, CALCIUM, PROTEINTOT, ALBUMIN, ALT, AST, ALKPHOS, BILITOT, EGFRIFNONA, GLOB, AGRATIO, BCR, ANIONGAP, BILIDIR, INDBILI    No results found for: HCGQUAL      Assessment/Plan:  1. 20 y.o. [id number].  Doing well with appropriately progressing pregnancy.  Patient stable at this time.  Fetal assessment reassuring today.  Patient undergo repeat  on .  Return to see me if needed if any concerns with baby arises.  Anticipate routine  and routine postoperative and postpartum care.      Carol Guzmán and I have discussed pain goals for this hospitalization after reviewing her current clinical condition, medical history and prior pain experiences.  The goal is to keep her pain level tolerable.  To help achieve this, I plan to use IV and oral pain medications to control postoperative pain..           This document has been electronically signed by Juan Chen DO on September 3, 2020 09:19

## 2020-09-08 ENCOUNTER — HOSPITAL ENCOUNTER (INPATIENT)
Facility: HOSPITAL | Age: 20
LOS: 3 days | Discharge: HOME | End: 2020-09-11
Attending: OBSTETRICS & GYNECOLOGY | Admitting: OBSTETRICS & GYNECOLOGY

## 2020-09-08 ENCOUNTER — ANESTHESIA (OUTPATIENT)
Dept: LABOR AND DELIVERY | Facility: HOSPITAL | Age: 20
End: 2020-09-08

## 2020-09-08 ENCOUNTER — ANESTHESIA EVENT (OUTPATIENT)
Dept: LABOR AND DELIVERY | Facility: HOSPITAL | Age: 20
End: 2020-09-08

## 2020-09-08 DIAGNOSIS — O34.211 ENCOUNTER FOR MATERNAL CARE FOR LOW TRANSVERSE SCAR FROM PREVIOUS CESAREAN DELIVERY: ICD-10-CM

## 2020-09-08 DIAGNOSIS — Z98.891 STATUS POST REPEAT LOW TRANSVERSE CESAREAN SECTION: Primary | ICD-10-CM

## 2020-09-08 DIAGNOSIS — N13.30 HYDRONEPHROSIS DETERMINED BY ULTRASOUND: ICD-10-CM

## 2020-09-08 DIAGNOSIS — O99.012 ANEMIA IN PREGNANCY, SECOND TRIMESTER: ICD-10-CM

## 2020-09-08 DIAGNOSIS — Z3A.38 38 WEEKS GESTATION OF PREGNANCY: ICD-10-CM

## 2020-09-08 DIAGNOSIS — Z34.82 SUPERVISION OF NORMAL INTRAUTERINE PREGNANCY IN MULTIGRAVIDA IN SECOND TRIMESTER: ICD-10-CM

## 2020-09-08 PROBLEM — Z34.90 NORMAL PREGNANCY: Status: ACTIVE | Noted: 2020-09-08

## 2020-09-08 LAB
ABO GROUP BLD: NORMAL
AMPHET+METHAMPHET UR QL: NEGATIVE
AMPHETAMINES UR QL: NEGATIVE
BARBITURATES UR QL SCN: NEGATIVE
BENZODIAZ UR QL SCN: NEGATIVE
BLD GP AB SCN SERPL QL: NEGATIVE
BUPRENORPHINE SERPL-MCNC: NEGATIVE NG/ML
CANNABINOIDS SERPL QL: NEGATIVE
COCAINE UR QL: NEGATIVE
DEPRECATED RDW RBC AUTO: 47.8 FL (ref 37–54)
ERYTHROCYTE [DISTWIDTH] IN BLOOD BY AUTOMATED COUNT: 13.4 % (ref 12.3–15.4)
HCT VFR BLD AUTO: 33.3 % (ref 34–46.6)
HGB BLD-MCNC: 11 G/DL (ref 12–15.9)
Lab: NORMAL
MCH RBC QN AUTO: 32.1 PG (ref 26.6–33)
MCHC RBC AUTO-ENTMCNC: 33 G/DL (ref 31.5–35.7)
MCV RBC AUTO: 97.1 FL (ref 79–97)
METHADONE UR QL SCN: NEGATIVE
OPIATES UR QL: NEGATIVE
OXYCODONE UR QL SCN: NEGATIVE
PCP UR QL SCN: NEGATIVE
PLATELET # BLD AUTO: 233 10*3/MM3 (ref 140–450)
PMV BLD AUTO: 8.6 FL (ref 6–12)
PROPOXYPH UR QL: NEGATIVE
RBC # BLD AUTO: 3.43 10*6/MM3 (ref 3.77–5.28)
RH BLD: POSITIVE
T&S EXPIRATION DATE: NORMAL
TRICYCLICS UR QL SCN: NEGATIVE
WBC # BLD AUTO: 12.63 10*3/MM3 (ref 3.4–10.8)

## 2020-09-08 PROCEDURE — 25010000002 MORPHINE PER 10 MG: Performed by: NURSE ANESTHETIST, CERTIFIED REGISTERED

## 2020-09-08 PROCEDURE — 25010000002 HYDROMORPHONE 1 MG/ML SOLUTION

## 2020-09-08 PROCEDURE — 25010000002 KETOROLAC TROMETHAMINE PER 15 MG: Performed by: OBSTETRICS & GYNECOLOGY

## 2020-09-08 PROCEDURE — 25010000002 KETOROLAC TROMETHAMINE PER 15 MG: Performed by: NURSE ANESTHETIST, CERTIFIED REGISTERED

## 2020-09-08 PROCEDURE — 25010000002 PHENYLEPHRINE PER 1 ML: Performed by: NURSE ANESTHETIST, CERTIFIED REGISTERED

## 2020-09-08 PROCEDURE — 86901 BLOOD TYPING SEROLOGIC RH(D): CPT | Performed by: OBSTETRICS & GYNECOLOGY

## 2020-09-08 PROCEDURE — 94799 UNLISTED PULMONARY SVC/PX: CPT

## 2020-09-08 PROCEDURE — 86850 RBC ANTIBODY SCREEN: CPT | Performed by: OBSTETRICS & GYNECOLOGY

## 2020-09-08 PROCEDURE — 85027 COMPLETE CBC AUTOMATED: CPT | Performed by: OBSTETRICS & GYNECOLOGY

## 2020-09-08 PROCEDURE — 86900 BLOOD TYPING SEROLOGIC ABO: CPT | Performed by: OBSTETRICS & GYNECOLOGY

## 2020-09-08 PROCEDURE — 25010000002 CEFAZOLIN PER 500 MG: Performed by: OBSTETRICS & GYNECOLOGY

## 2020-09-08 PROCEDURE — 25010000002 FENTANYL CITRATE (PF) 100 MCG/2ML SOLUTION: Performed by: NURSE ANESTHETIST, CERTIFIED REGISTERED

## 2020-09-08 PROCEDURE — 59515 CESAREAN DELIVERY: CPT | Performed by: OBSTETRICS & GYNECOLOGY

## 2020-09-08 PROCEDURE — 80306 DRUG TEST PRSMV INSTRMNT: CPT | Performed by: OBSTETRICS & GYNECOLOGY

## 2020-09-08 PROCEDURE — 51703 INSERT BLADDER CATH COMPLEX: CPT

## 2020-09-08 DEVICE — HEMOST ABS SURGICEL PWDR 3GM: Type: IMPLANTABLE DEVICE | Site: UTERUS | Status: FUNCTIONAL

## 2020-09-08 RX ORDER — NALOXONE HCL 0.4 MG/ML
0.04 VIAL (ML) INJECTION
Status: DISCONTINUED | OUTPATIENT
Start: 2020-09-08 | End: 2020-09-11 | Stop reason: HOSPADM

## 2020-09-08 RX ORDER — TRISODIUM CITRATE DIHYDRATE AND CITRIC ACID MONOHYDRATE 500; 334 MG/5ML; MG/5ML
30 SOLUTION ORAL ONCE
Status: COMPLETED | OUTPATIENT
Start: 2020-09-08 | End: 2020-09-08

## 2020-09-08 RX ORDER — HYDROCODONE BITARTRATE AND ACETAMINOPHEN 7.5; 325 MG/1; MG/1
1 TABLET ORAL EVERY 4 HOURS PRN
Status: DISCONTINUED | OUTPATIENT
Start: 2020-09-08 | End: 2020-09-11 | Stop reason: HOSPADM

## 2020-09-08 RX ORDER — OXYTOCIN/0.9 % SODIUM CHLORIDE 30/500 ML
85 PLASTIC BAG, INJECTION (ML) INTRAVENOUS ONCE
Status: DISCONTINUED | OUTPATIENT
Start: 2020-09-08 | End: 2020-09-11 | Stop reason: HOSPADM

## 2020-09-08 RX ORDER — MORPHINE SULFATE 1 MG/ML
INJECTION, SOLUTION EPIDURAL; INTRATHECAL; INTRAVENOUS AS NEEDED
Status: DISCONTINUED | OUTPATIENT
Start: 2020-09-08 | End: 2020-09-08 | Stop reason: SURG

## 2020-09-08 RX ORDER — SIMETHICONE 80 MG
80 TABLET,CHEWABLE ORAL 4 TIMES DAILY PRN
Status: DISCONTINUED | OUTPATIENT
Start: 2020-09-08 | End: 2020-09-11 | Stop reason: HOSPADM

## 2020-09-08 RX ORDER — PRENATAL VIT/IRON FUM/FOLIC AC 27MG-0.8MG
1 TABLET ORAL DAILY
Status: DISCONTINUED | OUTPATIENT
Start: 2020-09-08 | End: 2020-09-11 | Stop reason: HOSPADM

## 2020-09-08 RX ORDER — FENTANYL CITRATE 50 UG/ML
INJECTION, SOLUTION INTRAMUSCULAR; INTRAVENOUS AS NEEDED
Status: DISCONTINUED | OUTPATIENT
Start: 2020-09-08 | End: 2020-09-08

## 2020-09-08 RX ORDER — BUPIVACAINE HCL/0.9 % NACL/PF 0.1 %
2 PLASTIC BAG, INJECTION (ML) EPIDURAL EVERY 8 HOURS
Status: COMPLETED | OUTPATIENT
Start: 2020-09-08 | End: 2020-09-09

## 2020-09-08 RX ORDER — SODIUM CHLORIDE, SODIUM LACTATE, POTASSIUM CHLORIDE, CALCIUM CHLORIDE 600; 310; 30; 20 MG/100ML; MG/100ML; MG/100ML; MG/100ML
INJECTION, SOLUTION INTRAVENOUS
Status: COMPLETED
Start: 2020-09-08 | End: 2020-09-08

## 2020-09-08 RX ORDER — OXYTOCIN/0.9 % SODIUM CHLORIDE 30/500 ML
PLASTIC BAG, INJECTION (ML) INTRAVENOUS
Status: COMPLETED
Start: 2020-09-08 | End: 2020-09-08

## 2020-09-08 RX ORDER — OXYTOCIN/0.9 % SODIUM CHLORIDE 30/500 ML
85 PLASTIC BAG, INJECTION (ML) INTRAVENOUS ONCE
Status: COMPLETED | OUTPATIENT
Start: 2020-09-08 | End: 2020-09-08

## 2020-09-08 RX ORDER — BUPIVACAINE HCL/0.9 % NACL/PF 0.1 %
2 PLASTIC BAG, INJECTION (ML) EPIDURAL EVERY 8 HOURS
Status: DISCONTINUED | OUTPATIENT
Start: 2020-09-08 | End: 2020-09-08

## 2020-09-08 RX ORDER — SODIUM CHLORIDE 0.9 % (FLUSH) 0.9 %
3 SYRINGE (ML) INJECTION EVERY 12 HOURS SCHEDULED
Status: DISCONTINUED | OUTPATIENT
Start: 2020-09-08 | End: 2020-09-08 | Stop reason: HOSPADM

## 2020-09-08 RX ORDER — METHYLERGONOVINE MALEATE 0.2 MG/ML
200 INJECTION INTRAVENOUS ONCE AS NEEDED
Status: DISCONTINUED | OUTPATIENT
Start: 2020-09-08 | End: 2020-09-08 | Stop reason: HOSPADM

## 2020-09-08 RX ORDER — CEPHALEXIN 500 MG/1
500 CAPSULE ORAL DAILY
Status: DISCONTINUED | OUTPATIENT
Start: 2020-09-09 | End: 2020-09-11 | Stop reason: HOSPADM

## 2020-09-08 RX ORDER — IBUPROFEN 800 MG/1
800 TABLET ORAL EVERY 8 HOURS SCHEDULED
Status: DISCONTINUED | OUTPATIENT
Start: 2020-09-09 | End: 2020-09-11 | Stop reason: HOSPADM

## 2020-09-08 RX ORDER — BUPIVACAINE HYDROCHLORIDE 7.5 MG/ML
INJECTION, SOLUTION EPIDURAL; RETROBULBAR
Status: COMPLETED | OUTPATIENT
Start: 2020-09-08 | End: 2020-09-08

## 2020-09-08 RX ORDER — ONDANSETRON 2 MG/ML
4 INJECTION INTRAMUSCULAR; INTRAVENOUS ONCE
Status: DISCONTINUED | OUTPATIENT
Start: 2020-09-08 | End: 2020-09-11 | Stop reason: HOSPADM

## 2020-09-08 RX ORDER — SODIUM CHLORIDE, SODIUM LACTATE, POTASSIUM CHLORIDE, CALCIUM CHLORIDE 600; 310; 30; 20 MG/100ML; MG/100ML; MG/100ML; MG/100ML
INJECTION, SOLUTION INTRAVENOUS CONTINUOUS PRN
Status: DISCONTINUED | OUTPATIENT
Start: 2020-09-08 | End: 2020-09-08 | Stop reason: SURG

## 2020-09-08 RX ORDER — MISOPROSTOL 200 UG/1
800 TABLET ORAL AS NEEDED
Status: DISCONTINUED | OUTPATIENT
Start: 2020-09-08 | End: 2020-09-08 | Stop reason: HOSPADM

## 2020-09-08 RX ORDER — KETOROLAC TROMETHAMINE 30 MG/ML
30 INJECTION, SOLUTION INTRAMUSCULAR; INTRAVENOUS EVERY 6 HOURS
Status: DISPENSED | OUTPATIENT
Start: 2020-09-08 | End: 2020-09-09

## 2020-09-08 RX ORDER — OXYTOCIN/0.9 % SODIUM CHLORIDE 30/500 ML
PLASTIC BAG, INJECTION (ML) INTRAVENOUS CONTINUOUS PRN
Status: DISCONTINUED | OUTPATIENT
Start: 2020-09-08 | End: 2020-09-08 | Stop reason: SURG

## 2020-09-08 RX ORDER — NALOXONE HCL 0.4 MG/ML
0.1 VIAL (ML) INJECTION
Status: DISCONTINUED | OUTPATIENT
Start: 2020-09-08 | End: 2020-09-11 | Stop reason: HOSPADM

## 2020-09-08 RX ORDER — PROMETHAZINE HYDROCHLORIDE 12.5 MG/1
12.5 SUPPOSITORY RECTAL EVERY 6 HOURS PRN
Status: DISCONTINUED | OUTPATIENT
Start: 2020-09-08 | End: 2020-09-08 | Stop reason: HOSPADM

## 2020-09-08 RX ORDER — FENTANYL CITRATE 50 UG/ML
INJECTION, SOLUTION INTRAMUSCULAR; INTRAVENOUS AS NEEDED
Status: DISCONTINUED | OUTPATIENT
Start: 2020-09-08 | End: 2020-09-08 | Stop reason: SURG

## 2020-09-08 RX ORDER — LIDOCAINE HYDROCHLORIDE 10 MG/ML
5 INJECTION, SOLUTION EPIDURAL; INFILTRATION; INTRACAUDAL; PERINEURAL AS NEEDED
Status: DISCONTINUED | OUTPATIENT
Start: 2020-09-08 | End: 2020-09-08 | Stop reason: HOSPADM

## 2020-09-08 RX ORDER — OXYTOCIN/0.9 % SODIUM CHLORIDE 30/500 ML
650 PLASTIC BAG, INJECTION (ML) INTRAVENOUS ONCE
Status: DISCONTINUED | OUTPATIENT
Start: 2020-09-08 | End: 2020-09-11 | Stop reason: HOSPADM

## 2020-09-08 RX ORDER — OXYTOCIN/0.9 % SODIUM CHLORIDE 30/500 ML
650 PLASTIC BAG, INJECTION (ML) INTRAVENOUS ONCE
Status: DISCONTINUED | OUTPATIENT
Start: 2020-09-08 | End: 2020-09-08

## 2020-09-08 RX ORDER — ONDANSETRON 4 MG/1
4 TABLET, FILM COATED ORAL EVERY 6 HOURS PRN
Status: DISCONTINUED | OUTPATIENT
Start: 2020-09-08 | End: 2020-09-08 | Stop reason: HOSPADM

## 2020-09-08 RX ORDER — KETOROLAC TROMETHAMINE 30 MG/ML
INJECTION, SOLUTION INTRAMUSCULAR; INTRAVENOUS AS NEEDED
Status: DISCONTINUED | OUTPATIENT
Start: 2020-09-08 | End: 2020-09-08 | Stop reason: SURG

## 2020-09-08 RX ORDER — SODIUM CHLORIDE 0.9 % (FLUSH) 0.9 %
3-10 SYRINGE (ML) INJECTION AS NEEDED
Status: DISCONTINUED | OUTPATIENT
Start: 2020-09-08 | End: 2020-09-08 | Stop reason: HOSPADM

## 2020-09-08 RX ORDER — ALUMINA, MAGNESIA, AND SIMETHICONE 2400; 2400; 240 MG/30ML; MG/30ML; MG/30ML
15 SUSPENSION ORAL EVERY 4 HOURS PRN
Status: DISCONTINUED | OUTPATIENT
Start: 2020-09-08 | End: 2020-09-11 | Stop reason: HOSPADM

## 2020-09-08 RX ORDER — CARBOPROST TROMETHAMINE 250 UG/ML
250 INJECTION, SOLUTION INTRAMUSCULAR AS NEEDED
Status: DISCONTINUED | OUTPATIENT
Start: 2020-09-08 | End: 2020-09-08 | Stop reason: HOSPADM

## 2020-09-08 RX ORDER — ACETAMINOPHEN 325 MG/1
650 TABLET ORAL EVERY 4 HOURS PRN
Status: ACTIVE | OUTPATIENT
Start: 2020-09-08 | End: 2020-09-09

## 2020-09-08 RX ORDER — ONDANSETRON 2 MG/ML
4 INJECTION INTRAMUSCULAR; INTRAVENOUS EVERY 6 HOURS PRN
Status: DISCONTINUED | OUTPATIENT
Start: 2020-09-08 | End: 2020-09-08 | Stop reason: HOSPADM

## 2020-09-08 RX ORDER — PROMETHAZINE HYDROCHLORIDE 12.5 MG/1
12.5 TABLET ORAL EVERY 6 HOURS PRN
Status: DISCONTINUED | OUTPATIENT
Start: 2020-09-08 | End: 2020-09-08 | Stop reason: HOSPADM

## 2020-09-08 RX ORDER — BUPIVACAINE HCL/0.9 % NACL/PF 0.1 %
2 PLASTIC BAG, INJECTION (ML) EPIDURAL ONCE
Status: COMPLETED | OUTPATIENT
Start: 2020-09-08 | End: 2020-09-08

## 2020-09-08 RX ORDER — HYDROCORTISONE 25 MG/G
CREAM TOPICAL 3 TIMES DAILY PRN
Status: DISCONTINUED | OUTPATIENT
Start: 2020-09-08 | End: 2020-09-11 | Stop reason: HOSPADM

## 2020-09-08 RX ORDER — LANOLIN 100 %
OINTMENT (GRAM) TOPICAL
Status: DISCONTINUED | OUTPATIENT
Start: 2020-09-08 | End: 2020-09-11 | Stop reason: HOSPADM

## 2020-09-08 RX ORDER — DIPHENHYDRAMINE HCL 25 MG
25 CAPSULE ORAL EVERY 4 HOURS PRN
Status: DISCONTINUED | OUTPATIENT
Start: 2020-09-08 | End: 2020-09-11 | Stop reason: HOSPADM

## 2020-09-08 RX ADMIN — PHENYLEPHRINE HYDROCHLORIDE 50 MCG: 10 INJECTION INTRAVENOUS at 07:24

## 2020-09-08 RX ADMIN — PHENYLEPHRINE HYDROCHLORIDE 50 MCG: 10 INJECTION INTRAVENOUS at 07:21

## 2020-09-08 RX ADMIN — KETOROLAC TROMETHAMINE 30 MG: 30 INJECTION, SOLUTION INTRAMUSCULAR at 08:11

## 2020-09-08 RX ADMIN — Medication 0.5 MG: at 09:45

## 2020-09-08 RX ADMIN — MISOPROSTOL 800 MCG: 200 TABLET ORAL at 09:36

## 2020-09-08 RX ADMIN — PHENYLEPHRINE HYDROCHLORIDE 50 MCG: 10 INJECTION INTRAVENOUS at 07:30

## 2020-09-08 RX ADMIN — OXYTOCIN-SODIUM CHLORIDE 0.9% IV SOLN 30 UNIT/500ML 650 ML/HR: 30-0.9/5 SOLUTION at 07:39

## 2020-09-08 RX ADMIN — HYDROMORPHONE HYDROCHLORIDE 0.5 MG: 1 INJECTION, SOLUTION INTRAMUSCULAR; INTRAVENOUS; SUBCUTANEOUS at 09:45

## 2020-09-08 RX ADMIN — CEFAZOLIN SODIUM 2 G: 10 INJECTION, POWDER, FOR SOLUTION INTRAVENOUS at 23:38

## 2020-09-08 RX ADMIN — Medication 2 G: at 07:15

## 2020-09-08 RX ADMIN — KETOROLAC TROMETHAMINE 30 MG: 30 INJECTION, SOLUTION INTRAMUSCULAR at 19:49

## 2020-09-08 RX ADMIN — SODIUM CITRATE AND CITRIC ACID MONOHYDRATE 30 ML: 500; 334 SOLUTION ORAL at 06:15

## 2020-09-08 RX ADMIN — PHENYLEPHRINE HYDROCHLORIDE 50 MCG: 10 INJECTION INTRAVENOUS at 07:28

## 2020-09-08 RX ADMIN — FENTANYL CITRATE 15 MCG: 50 INJECTION, SOLUTION INTRAMUSCULAR; INTRAVENOUS at 07:18

## 2020-09-08 RX ADMIN — SODIUM CHLORIDE, POTASSIUM CHLORIDE, SODIUM LACTATE AND CALCIUM CHLORIDE: 600; 310; 30; 20 INJECTION, SOLUTION INTRAVENOUS at 08:32

## 2020-09-08 RX ADMIN — CEFAZOLIN SODIUM 2 G: 10 INJECTION, POWDER, FOR SOLUTION INTRAVENOUS at 15:29

## 2020-09-08 RX ADMIN — BUPIVACAINE HYDROCHLORIDE 1.4 ML: 7.5 INJECTION, SOLUTION EPIDURAL; RETROBULBAR at 07:21

## 2020-09-08 RX ADMIN — OXYTOCIN-SODIUM CHLORIDE 0.9% IV SOLN 30 UNIT/500ML 85 ML/HR: 30-0.9/5 SOLUTION at 08:57

## 2020-09-08 RX ADMIN — KETOROLAC TROMETHAMINE 30 MG: 30 INJECTION, SOLUTION INTRAMUSCULAR at 14:14

## 2020-09-08 RX ADMIN — Medication 0.1 MG: at 07:18

## 2020-09-08 RX ADMIN — SODIUM CHLORIDE, POTASSIUM CHLORIDE, SODIUM LACTATE AND CALCIUM CHLORIDE 1000 ML: 600; 310; 30; 20 INJECTION, SOLUTION INTRAVENOUS at 06:09

## 2020-09-08 RX ADMIN — SODIUM CHLORIDE, POTASSIUM CHLORIDE, SODIUM LACTATE AND CALCIUM CHLORIDE: 600; 310; 30; 20 INJECTION, SOLUTION INTRAVENOUS at 06:50

## 2020-09-08 NOTE — ANESTHESIA POSTPROCEDURE EVALUATION
Patient: Carol Guzmán    Procedure Summary     Date:  20 Room / Location:  [facility] LABOR DELIVERY  [facility] LABOR DELIVERY    Anesthesia Start:  707 Anesthesia Stop:  841    Procedure:   SECTION REPEAT (N/A Abdomen) Diagnosis:       Hydronephrosis determined by ultrasound      Anemia in pregnancy, second trimester      Supervision of normal intrauterine pregnancy in multigravida in second trimester      Encounter for maternal care for low transverse scar from previous  delivery      38 weeks gestation of pregnancy      (Hydronephrosis determined by ultrasound [N13.30])      (Anemia in pregnancy, second trimester [O99.012])      (Supervision of normal intrauterine pregnancy in multigravida in second trimester [Z34.82])      (Encounter for maternal care for low transverse scar from previous  delivery [O34.211])      (38 weeks gestation of pregnancy [Z3A.38])    Surgeon:  Juan Chen DO Provider:      Anesthesia Type:  spinal ASA Status:  2          Anesthesia Type: spinal    Vitals  Vitals Value Taken Time   /68 2020  8:36 AM   Temp 98.4 °F (36.9 °C) 2020  8:36 AM   Pulse 70 2020  8:41 AM   Resp 18 2020  8:36 AM   SpO2 100 % 2020  8:41 AM   Vitals shown include unvalidated device data.        Post Anesthesia Care and Evaluation    Patient location during evaluation: bedside  Patient participation: complete - patient participated  Level of consciousness: awake and alert  Pain score: 0  Pain management: adequate  Airway patency: patent  Anesthetic complications: No anesthetic complications  PONV Status: none  Cardiovascular status: acceptable  Respiratory status: acceptable  Hydration status: acceptable  Post Neuraxial Block status: Motor and sensory function returned to baseline and No signs or symptoms of PDPH

## 2020-09-08 NOTE — PLAN OF CARE
Problem: Patient Care Overview  Goal: Plan of Care Review  Outcome: Ongoing (interventions implemented as appropriate)  Flowsheets  Taken 2020 1000 by Sonali Parnell RN  Progress: improving  Taken 2020 1100 by Anai Rausch RN  Plan of Care Reviewed With: patient;spouse  Taken 2020 1535 by Anai Rausch RN  Outcome Summary: vss, stood at bedside, pain 0/10, light bleeding  Goal: Individualization and Mutuality  Outcome: Ongoing (interventions implemented as appropriate)  Goal: Discharge Needs Assessment  Outcome: Ongoing (interventions implemented as appropriate)  Goal: Interprofessional Rounds/Family Conf  Outcome: Ongoing (interventions implemented as appropriate)     Problem: Anesthesia/Analgesia, Neuraxial (Obstetrics)  Goal: Signs and Symptoms of Listed Potential Problems Will be Absent, Minimized or Managed (Anesthesia/Analgesia, Neuraxial)  Outcome: Ongoing (interventions implemented as appropriate)     Problem: Postpartum ( Delivery) (Adult,Obstetrics,Pediatric)  Goal: Signs and Symptoms of Listed Potential Problems Will be Absent, Minimized or Managed (Postpartum)  Outcome: Ongoing (interventions implemented as appropriate)  Goal: Anesthesia/Sedation Recovery  Outcome: Ongoing (interventions implemented as appropriate)     Problem: Breastfeeding (Adult,Obstetrics,Pediatric)  Goal: Signs and Symptoms of Listed Potential Problems Will be Absent, Minimized or Managed (Breastfeeding)  Outcome: Ongoing (interventions implemented as appropriate)     Problem: Fall Risk (Adult)  Goal: Identify Related Risk Factors and Signs and Symptoms  Outcome: Ongoing (interventions implemented as appropriate)  Goal: Absence of Fall  Outcome: Ongoing (interventions implemented as appropriate)

## 2020-09-08 NOTE — ANESTHESIA PROCEDURE NOTES
Spinal Block      Patient location during procedure: OR  Start Time: 9/8/2020 7:17 AM  Stop Time: 9/8/2020 7:21 AM  Indication:at surgeon's request  Performed By  CRNA: Tom Rivera CRNA  Preanesthetic Checklist  Completed: patient identified, site marked, surgical consent, pre-op evaluation, timeout performed, IV checked, risks and benefits discussed and monitors and equipment checked  Spinal Block Prep:  Patient Position:sitting  Sterile Tech:cap, gloves, gown, mask and sterile barriers  Prep:DuraPrep  Patient Monitoring:blood pressure monitoring, continuous pulse oximetry and EKG  Spinal Block Procedure  Approach:midline  Guidance:landmark technique and palpation technique  Location:L3-L4  Needle Type:Pencan  Needle Gauge:24 G  Placement of Spinal needle event:cerebrospinal fluid aspirated  Paresthesia: left and transient  Fluid Appearance:clear  Medications: bupivacaine PF (MARCAINE) 0.75 % injection, 1.4 mL   Post Assessment  Patient Tolerance:patient tolerated the procedure well with no apparent complications  Complications no

## 2020-09-08 NOTE — PLAN OF CARE
Problem: Patient Care Overview  Goal: Plan of Care Review  Outcome: Ongoing (interventions implemented as appropriate)  Flowsheets  Taken 9/8/2020 1000  Progress: improving  Outcome Summary: QBL 1078. Pt given diluadid 0.5mg for pain once during recovery due to frequent fundal checks. Cytotec 800mcg given rectally. Mom and baby stable for transfer to [facility].  Taken 9/8/2020 0901  Plan of Care Reviewed With: patient;spouse

## 2020-09-08 NOTE — ANESTHESIA PREPROCEDURE EVALUATION
Anesthesia Evaluation     Patient summary reviewed and Nursing notes reviewed   NPO Solid Status: > 8 hours  NPO Liquid Status: > 2 hours           Airway   Mallampati: II  TM distance: >3 FB  Neck ROM: full  No difficulty expected  Dental - normal exam     Pulmonary - normal exam   (+) asthma,  Cardiovascular - normal exam    (+) valvular problems/murmurs murmur,       Neuro/Psych- negative ROS  GI/Hepatic/Renal/Endo - negative ROS     Musculoskeletal (-) negative ROS    Abdominal  - normal exam   Substance History   (+) drug use (THC)     OB/GYN    (+) Pregnant,         Other - negative ROS                         Anesthesia Plan    ASA 2     spinal       Anesthetic plan, all risks, benefits, and alternatives have been provided, discussed and informed consent has been obtained with: patient.    Plan discussed with CRNA.

## 2020-09-08 NOTE — OP NOTE
Section Procedure Note    Carol Guzmán    2020     Indications: previous  section low transverse    Pre-operative Diagnosis: Patient with previous  section desiring repeat    Post-operative Diagnosis: Same as above    PROCEDURES PERFORMED: Procedure(s):   SECTION REPEAT    SURGEON: Juan Chen DO, FACOG    Assistant: Krystle Adair CSA was responsible for performing the following activities: Retraction, Suction, Irrigation, Suturing, Closing, Placing Dressing and Delivery of Fetus and their skilled assistance was necessary for the success of this case.     STAFF:   Circulator: Sonali Parnell RN  Scrub Person: Jessica Yin & EVERARDO Nurse: Jennifer Real RN  Assistant: Krystle Adair CSA    ANESTHESIA: Spinal    ANESTHESIA STAFF:  No anesthesia staff entered.    Findings: Normal-appearing uterus tubes and ovaries.  Small omental adhesion noted to the anterior portion of the uterus taken down without difficulty.    Birth Information  YOB: 2021   Time of birth: 7:37 AM   Delivering clinician:     Sex: female   Delivery type:     Breech type (if applicable):     Observed anomalies/comments:         Estimated Blood Loss:  800mL           Drains: Mallory                 Specimens: None               Complications:  None; patient tolerated the procedure well.           Disposition: Mother Baby Unit           Condition: stable    Procedure Details      After obtaining informed consent, the patient was taken to the operating room where spinal anesthesia was found to be adequate. Preoperative antibiotics were given.  A Mallory catheter and bilateral sequential compression devices were placed.  She was then prepped and draped in the normal sterile fashion in the dorsal supine position with a left lateral tilt. A final time out was performed. A Pfannenstiel skin incision was then made with the scalpel and carried through to the underlying layer  of fascia.  The fascia was incised in the midline and the incision extended laterally with Bovie.  The rectus muscles were then  in the midline, and the peritoneum was entered digitally.   The peritoneal incision was then extended superiorly and inferiorly with good visualization of the bladder.  A bladder flap was then created.    The lower uterine segment scored in a transverse fashion with the scalpel.  The uterus was then entered bluntly and the incision extended w/ traction.  The bladder blade was removed and the infant’s head was elevated to the level of the incision.  Fundal pressure was applied. The head was delivered atraumatically in OA position.  The anterior shoulder, posterior shoulder, and corpus were delivered without difficulty. The infant was handed off to waiting care team.      The placenta was then removed manually, the uterus exteriorized, and cleared of all clots and debris.  The uterine incision was repaired with 0 Vicryl in a running, locking fashion.  An imbricating layer was then performed with #1 chromic.  Four 2-0 chromic sutures were then placed in a figure-of-eight fashion over the hysterotomy to obtain hemostasis.  Most of the bleeding was noted at the left angle of the hysterotomy.  The uterine incision was inspected and hemostasis was noted. Posterior cul-de-sac was suctioned and uterus returned to the abdomen.  The gutters were cleared of all clots and irrigated with excellent hemostasis noted.  Surgicel powder was placed over the hysterotomy to ensure hemostasis. the fascia was reapproximated with 0 Vicryl in a running fashion. Markie's fascia was closed with 3-0 Vicryl in a running fashion. The skin was closed using 3-0 Monocryl suture.    The patient tolerated the procedure well.  Sponge, lap, and needle counts were correct times two. Vaginal sweep was completed.  The patient was taken to the recovery room in stable condition.      This document has been electronically  signed by Juan Chen DO on September 8, 2020 08:17

## 2020-09-09 LAB
BASOPHILS # BLD AUTO: 0.03 10*3/MM3 (ref 0–0.2)
BASOPHILS NFR BLD AUTO: 0.3 % (ref 0–1.5)
DEPRECATED RDW RBC AUTO: 47.4 FL (ref 37–54)
EOSINOPHIL # BLD AUTO: 0.18 10*3/MM3 (ref 0–0.4)
EOSINOPHIL NFR BLD AUTO: 1.8 % (ref 0.3–6.2)
ERYTHROCYTE [DISTWIDTH] IN BLOOD BY AUTOMATED COUNT: 13.4 % (ref 12.3–15.4)
HCT VFR BLD AUTO: 21.7 % (ref 34–46.6)
HGB BLD-MCNC: 7.2 G/DL (ref 12–15.9)
IMM GRANULOCYTES # BLD AUTO: 0.16 10*3/MM3 (ref 0–0.05)
IMM GRANULOCYTES NFR BLD AUTO: 1.6 % (ref 0–0.5)
LYMPHOCYTES # BLD AUTO: 1.49 10*3/MM3 (ref 0.7–3.1)
LYMPHOCYTES NFR BLD AUTO: 14.6 % (ref 19.6–45.3)
MCH RBC QN AUTO: 32.7 PG (ref 26.6–33)
MCHC RBC AUTO-ENTMCNC: 33.2 G/DL (ref 31.5–35.7)
MCV RBC AUTO: 98.6 FL (ref 79–97)
MONOCYTES # BLD AUTO: 0.63 10*3/MM3 (ref 0.1–0.9)
MONOCYTES NFR BLD AUTO: 6.2 % (ref 5–12)
NEUTROPHILS NFR BLD AUTO: 7.75 10*3/MM3 (ref 1.7–7)
NEUTROPHILS NFR BLD AUTO: 75.5 % (ref 42.7–76)
NRBC BLD AUTO-RTO: 0 /100 WBC (ref 0–0.2)
PLATELET # BLD AUTO: 155 10*3/MM3 (ref 140–450)
PMV BLD AUTO: 8.8 FL (ref 6–12)
RBC # BLD AUTO: 2.2 10*6/MM3 (ref 3.77–5.28)
WBC # BLD AUTO: 10.24 10*3/MM3 (ref 3.4–10.8)

## 2020-09-09 PROCEDURE — 85025 COMPLETE CBC W/AUTO DIFF WBC: CPT | Performed by: OBSTETRICS & GYNECOLOGY

## 2020-09-09 PROCEDURE — 25010000002 KETOROLAC TROMETHAMINE PER 15 MG: Performed by: OBSTETRICS & GYNECOLOGY

## 2020-09-09 PROCEDURE — 99024 POSTOP FOLLOW-UP VISIT: CPT | Performed by: OBSTETRICS & GYNECOLOGY

## 2020-09-09 RX ADMIN — IBUPROFEN 800 MG: 800 TABLET, FILM COATED ORAL at 20:47

## 2020-09-09 RX ADMIN — IBUPROFEN 800 MG: 800 TABLET, FILM COATED ORAL at 10:19

## 2020-09-09 RX ADMIN — HYDROCODONE BITARTRATE AND ACETAMINOPHEN 1 TABLET: 7.5; 325 TABLET ORAL at 07:23

## 2020-09-09 RX ADMIN — HYDROCODONE BITARTRATE AND ACETAMINOPHEN 1 TABLET: 7.5; 325 TABLET ORAL at 00:01

## 2020-09-09 RX ADMIN — HYDROCODONE BITARTRATE AND ACETAMINOPHEN 1 TABLET: 7.5; 325 TABLET ORAL at 16:10

## 2020-09-09 RX ADMIN — CEPHALEXIN 500 MG: 500 CAPSULE ORAL at 08:30

## 2020-09-09 RX ADMIN — KETOROLAC TROMETHAMINE 30 MG: 30 INJECTION, SOLUTION INTRAMUSCULAR at 02:12

## 2020-09-09 RX ADMIN — PRENATAL VIT W/ FE FUMARATE-FA TAB 27-0.8 MG 1 TABLET: 27-0.8 TAB at 08:30

## 2020-09-09 RX ADMIN — HYDROCODONE BITARTRATE AND ACETAMINOPHEN 1 TABLET: 7.5; 325 TABLET ORAL at 20:47

## 2020-09-09 NOTE — CONSULTS
"SW received consult re: hx of positive UDS during pregnancy. Patient tested positive for THC on 2/17/20. UDS normal on admission. Patient's baby Park Robison (MRN [id number]) UDS normal as well. Meconium pending. SW spoke with patient via telephone. Patient agreeable to answer quetions via telephone with FOB in room. Patient admitted to THC use during very beginning of pregnancy but states she soon quit after she found out she was expecting. Admits to \"taking gummies\" that shows up as THC on UDS. States was taking those around February. States she did not use any other substances during pregnancy. She is aware baby Park's UDS normal. Understands SW will follow for meconium results and that a referral to [facility] will be made if positive for anything not prescribed. No other questions or concerns at this time.    TEAGAN Santiago  "

## 2020-09-09 NOTE — PROGRESS NOTES
[facility]  Progress Note  Hospital Day: 1  Subjective:  Postop day: 1 Day Post-Op  The patient feels well. Pain is well controlled with current medications. The baby is well. The patient is ambulating well. The patient is tolerating a normal diet. Flatus has been passed.  Lochia normal.  Feeding method: Breastfeed.  Birth control plan: Oral contraceptive.    Meds Reviewed  ROS reviewed. All other ROS other than mentioned above are reported as negative.  Surgical history updated.  Prenatal Labs:  No components found for: RUBELLA  No results found for: ABORH    Objective:  Vitals:    20 1820 20 1951 20 0050 20 0454   BP: 115/59 123/66 112/68 111/53   BP Location: Right arm Right arm Right arm Right arm   Patient Position: Sitting Lying Lying Lying   Pulse: 74 79 84 76   Resp: 18 18 18 18   Temp: 98 °F (36.7 °C) 98.6 °F (37 °C) 98.1 °F (36.7 °C) 98.3 °F (36.8 °C)   TempSrc: Temporal Oral Oral Oral   SpO2: 98% 100% 98% 97%      General: alert, well appearing, in no apparent distress  CVS: RRR, S1/S2, no murmur  Lungs: clear to auscultation, no wheezes or rales and unlabored breathing  Bowel Sounds: active  Uterine Fundus: firm  Incision: healing well  Dressing: present    Labs:  Lab Results   Component Value Date    WBC 12.63 (H) 2020    HGB 11.0 (L) 2020    HCT 33.3 (L) 2020    MCV 97.1 (H) 2020     2020    RH Positive 2020    HEPBSAG Non-Reactive 2020     ?  Assessment:   Active Hospital Problems    Diagnosis  POA   • Normal pregnancy [Z34.90]  Not Applicable   • Status post repeat low transverse  section [Z98.891]  Not Applicable   • 38 weeks gestation of pregnancy [Z3A.38]  Unknown   • Hydronephrosis determined by ultrasound [N13.30]  Unknown   • Anemia in pregnancy, second trimester [O99.012]  Unknown   • Supervision of normal intrauterine pregnancy in multigravida in second trimester [Z34.82]  Not  Applicable   • Encounter for maternal care for low transverse scar from previous  delivery [O34.211]  Unknown      Resolved Hospital Problems   No resolved problems to display.       Plan:  20 y.o.  39w1d with Estimated Date of Delivery: 20 s/p Repeat , Low Transverse  of viable Female  POD 1.     Doing well postoperatively..      This document has been electronically signed by Chilo Harris MD on 2020 05:54

## 2020-09-09 NOTE — PLAN OF CARE
Problem: Patient Care Overview  Goal: Plan of Care Review  Outcome: Ongoing (interventions implemented as appropriate)  Flowsheets (Taken 9/9/2020 3586)  Progress: improving  Plan of Care Reviewed With: patient  Outcome Summary: Ambulating in room, altamirano d/c'd, voids, breastfeeding and pumping when baby won't latch.

## 2020-09-09 NOTE — PLAN OF CARE
Problem: Patient Care Overview  Goal: Plan of Care Review  Outcome: Ongoing (interventions implemented as appropriate)  Flowsheets (Taken 9/9/2020 1700)  Progress: improving  Plan of Care Reviewed With: patient;significant other  Outcome Summary: VSS, ambulating to bathroom, pian controlled with Norco, breastfeeding, voiding, lochia light, plans to shower

## 2020-09-10 PROCEDURE — 99024 POSTOP FOLLOW-UP VISIT: CPT | Performed by: OBSTETRICS & GYNECOLOGY

## 2020-09-10 RX ADMIN — IBUPROFEN 800 MG: 800 TABLET, FILM COATED ORAL at 21:16

## 2020-09-10 RX ADMIN — HYDROCODONE BITARTRATE AND ACETAMINOPHEN 1 TABLET: 7.5; 325 TABLET ORAL at 07:46

## 2020-09-10 RX ADMIN — CEPHALEXIN 500 MG: 500 CAPSULE ORAL at 09:04

## 2020-09-10 RX ADMIN — HYDROCODONE BITARTRATE AND ACETAMINOPHEN 1 TABLET: 7.5; 325 TABLET ORAL at 14:26

## 2020-09-10 RX ADMIN — IBUPROFEN 800 MG: 800 TABLET, FILM COATED ORAL at 06:19

## 2020-09-10 RX ADMIN — PRENATAL VIT W/ FE FUMARATE-FA TAB 27-0.8 MG 1 TABLET: 27-0.8 TAB at 09:04

## 2020-09-10 RX ADMIN — IBUPROFEN 800 MG: 800 TABLET, FILM COATED ORAL at 14:26

## 2020-09-10 NOTE — PROGRESS NOTES
[facility]  Progress Note  Hospital Day: 2  Subjective:  Postop day: 2 Days Post-Op  The patient feels well. Pain is well controlled with current medications. The baby is well. The patient is ambulating well. The patient is tolerating a normal diet. Flatus has been passed.  Lochia normal.  Feeding method: Breastfeed.  Birth control plan: Oral contraceptive    Meds Reviewed  ROS reviewed. All other ROS other than mentioned above are reported as negative.  Surgical history updated.  Prenatal Labs:  No components found for: RUBELLA  No results found for: ABORH    Objective:  Vitals:    20 0827 20 1651 20 2202 09/10/20 0207   BP: 118/57 111/59 125/61 112/57   BP Location: Left arm Right arm Right arm Right arm   Patient Position: Lying Lying Lying Lying   Pulse: 80 85 87 82   Resp: 18 20 18 18   Temp: 98.4 °F (36.9 °C) 98.6 °F (37 °C) 97.6 °F (36.4 °C) 96.8 °F (36 °C)   TempSrc: Oral Temporal Oral Oral   SpO2: 98% 98% 98% 99%      General: alert, well appearing, in no apparent distress  CVS: RRR, S1/S2, no murmur  Lungs: clear to auscultation, no wheezes or rales and unlabored breathing  Bowel Sounds: active  Uterine Fundus: firm  Incision: healing well  Dressing: Removed    Labs:  Lab Results   Component Value Date    WBC 10.24 2020    HGB 7.2 (L) 2020    HCT 21.7 (L) 2020    MCV 98.6 (H) 2020     2020    RH Positive 2020    HEPBSAG Non-Reactive 2020     ?  Assessment:   Active Hospital Problems    Diagnosis  POA   • Normal pregnancy [Z34.90]  Not Applicable   • Status post repeat low transverse  section [Z98.891]  Not Applicable   • 38 weeks gestation of pregnancy [Z3A.38]  Unknown   • Hydronephrosis determined by ultrasound [N13.30]  Unknown   • Anemia in pregnancy, second trimester [O99.012]  Unknown   • Supervision of normal intrauterine pregnancy in multigravida in second trimester [Z34.82]  Not Applicable   •  Encounter for maternal care for low transverse scar from previous  delivery [O34.211]  Unknown      Resolved Hospital Problems   No resolved problems to display.       Plan:  20 y.o.  39w1d with Estimated Date of Delivery: 20 s/p , Low Transverse  for repeat of viable Female  POD 2.  Vital signs stable.  Slightly higher drop in H&H than anticipated although patient is stable.    Doing well postoperatively.    Continue postoperative care  Continue postpartum care  Encourage ambulation and breast-feeding  Continue inpatient management at this time.    This document has been electronically signed by Chilo Harris MD on September 10, 2020 05:29      I have seen and evaluated the patient.  I have discussed the case with the resident. I have reviewed the notes, assessment and plan, and/or procedures performed by the resident. I concur with the resident’s documentation.        This document has been electronically signed by Juan Chen DO on September 10, 2020 06:28

## 2020-09-10 NOTE — PLAN OF CARE
Problem: Patient Care Overview  Goal: Plan of Care Review  Outcome: Ongoing (interventions implemented as appropriate)  Flowsheets  Taken 9/10/2020 0513 by Sonali Fernandez RN  Progress: improving  Taken 9/10/2020 0906 by Evita Hendrickson RN  Plan of Care Reviewed With: patient;significant other  Taken 9/10/2020 1812 by Evita Hendrickson RN  Outcome Summary: VSS; ambulating in room, paincontrolled with scheduled and prn po pain meds. Breastfeeding.  voids.

## 2020-09-10 NOTE — LACTATION NOTE
This note was copied from a baby's chart.  Checked on mom and baby this morning. Infant is breastfeeding well. Mother is latching the baby well. Nipple discomfort has improved with positioning adjustments. I give the mother some lanolin and nipple shells to help with nipple discomfort. Mother was also issued a pump yesterday for home use. No further questions or concerns.

## 2020-09-10 NOTE — PLAN OF CARE
Problem: Patient Care Overview  Goal: Plan of Care Review  Outcome: Ongoing (interventions implemented as appropriate)  Flowsheets  Taken 9/10/2020 0513  Progress: improving  Outcome Summary: vss, ambulating in room, pain controlled with po prn meds, breastfeeding, voiding  Taken 9/9/2020 2100  Plan of Care Reviewed With: patient;significant other

## 2020-09-11 VITALS
RESPIRATION RATE: 18 BRPM | DIASTOLIC BLOOD PRESSURE: 78 MMHG | SYSTOLIC BLOOD PRESSURE: 118 MMHG | OXYGEN SATURATION: 100 % | HEART RATE: 72 BPM | TEMPERATURE: 98 F

## 2020-09-11 PROCEDURE — 99024 POSTOP FOLLOW-UP VISIT: CPT | Performed by: OBSTETRICS & GYNECOLOGY

## 2020-09-11 RX ORDER — IBUPROFEN 800 MG/1
800 TABLET ORAL EVERY 8 HOURS SCHEDULED
Qty: 60 TABLET | Refills: 2 | Status: SHIPPED | OUTPATIENT
Start: 2020-09-11

## 2020-09-11 RX ORDER — HYDROCODONE BITARTRATE AND ACETAMINOPHEN 7.5; 325 MG/1; MG/1
1 TABLET ORAL EVERY 4 HOURS PRN
Qty: 20 TABLET | Refills: 0 | Status: SHIPPED | OUTPATIENT
Start: 2020-09-11 | End: 2020-09-15

## 2020-09-11 RX ORDER — CEPHALEXIN 500 MG/1
500 CAPSULE ORAL DAILY
Qty: 45 CAPSULE | Refills: 0 | Status: SHIPPED | OUTPATIENT
Start: 2020-09-11 | End: 2020-10-26

## 2020-09-11 RX ADMIN — CEPHALEXIN 500 MG: 500 CAPSULE ORAL at 08:01

## 2020-09-11 RX ADMIN — PRENATAL VIT W/ FE FUMARATE-FA TAB 27-0.8 MG 1 TABLET: 27-0.8 TAB at 08:01

## 2020-09-11 RX ADMIN — IBUPROFEN 800 MG: 800 TABLET, FILM COATED ORAL at 05:19

## 2020-09-11 RX ADMIN — HYDROCODONE BITARTRATE AND ACETAMINOPHEN 1 TABLET: 7.5; 325 TABLET ORAL at 05:20

## 2020-09-11 RX ADMIN — HYDROCODONE BITARTRATE AND ACETAMINOPHEN 1 TABLET: 7.5; 325 TABLET ORAL at 11:29

## 2020-09-11 NOTE — PROGRESS NOTES
[facility]  Progress Note  Subjective:  Postop day: 3 Days Post-Op  The patient feels well. Pain is well controlled with current medications. The baby is well. The patient is ambulating well. The patient is tolerating a normal diet. Flatus has been passed.  Lochia normal.  Feeding method: Breastfeed.  Birth control plan: Oral contraceptive    Meds Reviewed  ROS reviewed. All other ROS other than mentioned above are reported as negative.  Surgical history updated.  Prenatal Labs:  No components found for: RUBELLA  No results found for: ABORH    Objective:  Vitals:    09/10/20 0906 09/10/20 1435 09/10/20 2200 20 0517   BP: 117/64 127/59 121/66 118/78   BP Location: Right arm Right arm     Patient Position: Sitting Sitting     Pulse: 76 77 83 74   Resp: 16 16 18 18   Temp: 98 °F (36.7 °C) 98.5 °F (36.9 °C) 98.3 °F (36.8 °C) 97.4 °F (36.3 °C)   TempSrc: Temporal Oral Oral Temporal   SpO2: 100% 100% 99% 100%      General: alert, well appearing, in no apparent distress  CVS: RRR, S1/S2, no murmur  Lungs: clear to auscultation, no wheezes or rales and unlabored breathing  Bowel Sounds: active  Uterine Fundus: firm  Incision: healing well  Dressing: None    Labs:  Lab Results   Component Value Date    WBC 10.24 2020    HGB 7.2 (L) 2020    HCT 21.7 (L) 2020    MCV 98.6 (H) 2020     2020    RH Positive 2020    HEPBSAG Non-Reactive 2020     ?  Assessment:   Active Hospital Problems    Diagnosis  POA   • Normal pregnancy [Z34.90]  Not Applicable   • Status post repeat low transverse  section [Z98.891]  Not Applicable   • 38 weeks gestation of pregnancy [Z3A.38]  Unknown   • Hydronephrosis determined by ultrasound [N13.30]  Unknown   • Anemia in pregnancy, second trimester [O99.012]  Unknown   • Supervision of normal intrauterine pregnancy in multigravida in second trimester [Z34.82]  Not Applicable   • Encounter for maternal care for  low transverse scar from previous  delivery [O34.211]  Unknown      Resolved Hospital Problems   No resolved problems to display.       Plan:  20 y.o.  39w1d with Estimated Date of Delivery: 20 s/p , Low Transverse  for repeat of viable Female  POD 3.     Doing well postoperatively..      This document has been electronically signed by Chilo Harris MD on 2020 05:42

## 2020-09-11 NOTE — PLAN OF CARE
Problem: Patient Care Overview  Goal: Plan of Care Review  Outcome: Ongoing (interventions implemented as appropriate)  Flowsheets (Taken 9/11/2020 0628)  Progress: improving  Plan of Care Reviewed With: patient; spouse  Outcome Summary: vss, ambulating in room, pain controlled with prn meds and scheduled meds, breastfeeding and supplementing.

## 2020-09-11 NOTE — PLAN OF CARE
Problem: Patient Care Overview  Goal: Plan of Care Review  Outcome: Outcome(s) achieved  Flowsheets  Taken 9/11/2020 0836 by Jennifer Momin, RN  Progress: improving  Outcome Summary: VSS, waking [place]ornton and room, takning care of nb and self care with spoouse assist if needed, pain controlled, passing gas, will f/u nara Chen in [place]  Taken 9/11/2020 0628 by Sonali Fernandez, RN  Plan of Care Reviewed With: patient;spouse

## 2020-09-11 NOTE — DISCHARGE SUMMARY
[facility] Corina Guzmán  : 2000  MRN: [id number]  CSN: [id number]    Discharge Summary    Date of Admission: 2020  Date of Discharge:     Principle Discharge Dx:  1.  Scheduled repeat low transverse  section    Procedures Performed:  Procedure(s):   SECTION REPEAT    Brief History:  Patient is a 20 y.o. now  who presented to labor and delivery for scheduled repeat low transverse  section underwent uncomplicated surgery and uncomplicated postoperative and postpartum course..    Hospital Course:  Her post-operative course was unremarkable.  On POD # 3 she expressed the desire for discharge. She had no febrile morbidity. She had passed gas, and was urinating normally. She was eating a regular diet without difficulty. She was ambulating well. Her incision was clean, dry and intact. Discharge instructions were given.  All questions were answered.    Condition:  Stable  Discharge Activity:    Activity Instructions     Bathing Restrictions      Type of Restriction:  Bathing    Bathing Restrictions:  Other    Explain Bathing Restrictions:  No soaking in bathtub for 4 weeks, showers are fine.    Driving Restrictions      Type of Restriction:  Driving    Driving Restrictions:  No Driving (Time Limited)    Length:  Other    Indicate Length of Restriction:  No driving for 1 week or while on narcotic pain medications. Riding is car is fine.    Lifting Restrictions      Type of Restriction:  Lifting    Lifting Restrictions:  Other    Explain Lifing Restrictions:  No lifting more than infant and baby carrier together for 6 weeks.    Pelvic Rest      Nothing in the vagina for 6 weeks to include tampons or intercourse.    Sexual Activity Restrictions      Type of Restriction:  Sex    Explain Sexual Activity Restrictions:  No sexual intercourse for at least 6 weeks        Discharge Diet:  Regular  Discharge Medications:       Your medication list      START taking these  medications      Instructions Last Dose Given Next Dose Due   HYDROcodone-acetaminophen 7.5-325 MG per tablet  Commonly known as:  NORCO      Take 1 tablet by mouth Every 4 (Four) Hours As Needed for Moderate Pain  for up to 4 days.       ibuprofen 800 MG tablet  Commonly known as:  ADVIL,MOTRIN      Take 1 tablet by mouth Every 8 (Eight) Hours.          CHANGE how you take these medications      Instructions Last Dose Given Next Dose Due   cephalexin 500 MG capsule  Commonly known as:  Keflex  What changed:  when to take this      Take 1 capsule by mouth Daily for 45 doses. Indications: Urinary Tract Infection          CONTINUE taking these medications      Instructions Last Dose Given Next Dose Due   Cyanocobalamin 1000 MCG/ML kit  Commonly known as:  B-12 Compliance Injection      Inject 1 each as directed Take As Directed. Daily for 7 days, weekly for 4 weeks then monthly.       docusate sodium 100 MG capsule  Commonly known as:  COLACE      Take 100 mg by mouth Daily.       ferrous sulfate 325 (65 FE) MG tablet      Take 325 mg by mouth 2 (Two) Times a Day.       folic acid 1 MG tablet  Commonly known as:  FOLVITE      Take 1 tablet by mouth Daily.       Needles & Syringes misc      1 application Take As Directed.       omeprazole OTC 20 MG EC tablet  Commonly known as:  PrilOSEC OTC      Take 1 tablet by mouth Daily. Take daily 30 minutes prior to first morning meal       prenatal (CLASSIC) vitamin  tablet  Generic drug:  prenatal vitamin      Take  by mouth Daily.             Where to Get Your Medications      These medications were sent to [facility] - [place] [address]    Hours:  Monday through Friday 7:00am to 5:00pm Phone:  [phone]   · cephalexin 500 MG capsule  · HYDROcodone-acetaminophen 7.5-325 MG per tablet  · ibuprofen 800 MG tablet       Discharge Disposition:  Home  Follow-Up:    Future Appointments   Date Time Provider Department Center   10/9/2020  10:15 AM NURSE  [facility]  [facility] O[facility]   10/9/2020 10:45 AM Susan Murphy MD [facility] ONC [facility]         This document has been electronically signed by Juan Chen DO on September 11, 2020 07:33

## 2020-09-14 NOTE — PAYOR COMM NOTE
"[facility]  [facility] :[phone]  F: [phone]    [facility]#[id number]    Aviva Guzmán (20 y.o. Female)     Date of Birth Social Security Number Address Home Phone MRN    2000  [address]  [place] [address] [phone] [id number]    Sikhism Marital Status          None Single       Admission Date Admission Type Admitting Provider Attending Provider Department, Room/Bed    20 Elective Juan Chen DO  [facility] MOTHER BABY, M758/1    Discharge Date Discharge Disposition Discharge Destination        2020 Home or Self Care              Attending Provider: (none)   Allergies: Percocet [Oxycodone-acetaminophen]    Isolation: None   Infection: None   Code Status: Prior    Ht: 149.9 cm (59\")   Wt: 68.9 kg (152 lb)    Admission Cmt: None   Principal Problem: None                Active Insurance as of 2020     Primary Coverage     Payor Plan Insurance Group Employer/Plan Group    [place] [id number]     Payor Plan Address Payor Plan Phone Number Payor Plan Fax Number Effective Dates    [employer] Claims Office - [address] [address] [phone]  2020 - None Entered    [facility] [id number]       Subscriber Name Subscriber Birth Date Member ID       AVIVA GUZMÁN 2000 [id number]                 Emergency Contacts      (Rel.) Home Phone Work Phone Mobile Phone    PO GUZMÁN (Godmother) [phone] -- [phone]               Discharge Summary      Juan Chen DO at 20 0733          [facility] Corina Guzmán  : 2000  MRN: [id number]  CSN: [id number]    Discharge Summary    Date of Admission: 2020  Date of Discharge:     Principle Discharge Dx:  1.  Scheduled repeat low transverse  section    Procedures Performed:  Procedure(s):   SECTION REPEAT    Brief History:  Patient is a 20 y.o. now  who " presented to labor and delivery for scheduled repeat low transverse  section underwent uncomplicated surgery and uncomplicated postoperative and postpartum course..    Hospital Course:  Her post-operative course was unremarkable.  On POD # 3 she expressed the desire for discharge. She had no febrile morbidity. She had passed gas, and was urinating normally. She was eating a regular diet without difficulty. She was ambulating well. Her incision was clean, dry and intact. Discharge instructions were given.  All questions were answered.    Condition:  Stable  Discharge Activity:    Activity Instructions     Bathing Restrictions      Type of Restriction:  Bathing    Bathing Restrictions:  Other    Explain Bathing Restrictions:  No soaking in bathtub for 4 weeks, showers are fine.    Driving Restrictions      Type of Restriction:  Driving    Driving Restrictions:  No Driving (Time Limited)    Length:  Other    Indicate Length of Restriction:  No driving for 1 week or while on narcotic pain medications. Riding is car is fine.    Lifting Restrictions      Type of Restriction:  Lifting    Lifting Restrictions:  Other    Explain Lifing Restrictions:  No lifting more than infant and baby carrier together for 6 weeks.    Pelvic Rest      Nothing in the vagina for 6 weeks to include tampons or intercourse.    Sexual Activity Restrictions      Type of Restriction:  Sex    Explain Sexual Activity Restrictions:  No sexual intercourse for at least 6 weeks        Discharge Diet:  Regular  Discharge Medications:       Your medication list      START taking these medications      Instructions Last Dose Given Next Dose Due   HYDROcodone-acetaminophen 7.5-325 MG per tablet  Commonly known as:  NORCO      Take 1 tablet by mouth Every 4 (Four) Hours As Needed for Moderate Pain  for up to 4 days.       ibuprofen 800 MG tablet  Commonly known as:  ADVIL,MOTRIN      Take 1 tablet by mouth Every 8 (Eight) Hours.          CHANGE how  you take these medications      Instructions Last Dose Given Next Dose Due   cephalexin 500 MG capsule  Commonly known as:  Keflex  What changed:  when to take this      Take 1 capsule by mouth Daily for 45 doses. Indications: Urinary Tract Infection          CONTINUE taking these medications      Instructions Last Dose Given Next Dose Due   Cyanocobalamin 1000 MCG/ML kit  Commonly known as:  B-12 Compliance Injection      Inject 1 each as directed Take As Directed. Daily for 7 days, weekly for 4 weeks then monthly.       docusate sodium 100 MG capsule  Commonly known as:  COLACE      Take 100 mg by mouth Daily.       ferrous sulfate 325 (65 FE) MG tablet      Take 325 mg by mouth 2 (Two) Times a Day.       folic acid 1 MG tablet  Commonly known as:  FOLVITE      Take 1 tablet by mouth Daily.       Needles & Syringes misc      1 application Take As Directed.       omeprazole OTC 20 MG EC tablet  Commonly known as:  PrilOSEC OTC      Take 1 tablet by mouth Daily. Take daily 30 minutes prior to first morning meal       prenatal (CLASSIC) vitamin  tablet  Generic drug:  prenatal vitamin      Take  by mouth Daily.             Where to Get Your Medications      These medications were sent to [facility] - [place] [address]    Hours:  Monday through Friday 7:00am to 5:00pm Phone:  [phone]   · cephalexin 500 MG capsule  · HYDROcodone-acetaminophen 7.5-325 MG per tablet  · ibuprofen 800 MG tablet       Discharge Disposition:  Home  Follow-Up:    Future Appointments   Date Time Provider Department Center   10/9/2020 10:15 AM NURSE JOSEE BUTTS  [facility]   10/9/2020 10:45 AM Susan Murphy MD [facility]         This document has been electronically signed by Juan Chen DO on September 11, 2020 07:33            Electronically signed by Juan Chen DO at 09/11/20 0734       Discharge Order (From admission, onward)     Start     Ordered     09/11/20 0730  Discharge patient  Once     Expected Discharge Date: 09/11/20    Discharge Disposition: Home or Self Care    Physician of Record for Attribution - Please select from Treatment Team: ELI MILLAN [[id number]]    Review needed by CMO to determine Physician of Record: No       Question Answer Comment   Physician of Record for Attribution - Please select from Treatment Team ELI MILLAN    Review needed by CMO to determine Physician of Record No        09/11/20 0733

## 2020-09-17 ENCOUNTER — OFFICE VISIT (OUTPATIENT)
Dept: OBSTETRICS AND GYNECOLOGY | Facility: CLINIC | Age: 20
End: 2020-09-17

## 2020-09-17 VITALS
WEIGHT: 136 LBS | HEIGHT: 59 IN | BODY MASS INDEX: 27.42 KG/M2 | DIASTOLIC BLOOD PRESSURE: 62 MMHG | SYSTOLIC BLOOD PRESSURE: 118 MMHG

## 2020-09-17 DIAGNOSIS — Z98.891 STATUS POST REPEAT LOW TRANSVERSE CESAREAN SECTION: Primary | ICD-10-CM

## 2020-09-17 DIAGNOSIS — Z09 POSTOPERATIVE FOLLOW-UP: ICD-10-CM

## 2020-09-17 PROCEDURE — 0503F POSTPARTUM CARE VISIT: CPT | Performed by: OBSTETRICS & GYNECOLOGY

## 2020-09-17 RX ORDER — HYDROCODONE BITARTRATE AND ACETAMINOPHEN 7.5; 325 MG/1; MG/1
1 TABLET ORAL EVERY 6 HOURS PRN
Qty: 15 TABLET | Refills: 0 | Status: SHIPPED | OUTPATIENT
Start: 2020-09-17

## 2020-09-17 RX ORDER — ACETAMINOPHEN AND CODEINE PHOSPHATE 120; 12 MG/5ML; MG/5ML
1 SOLUTION ORAL DAILY
Qty: 28 TABLET | Refills: 12 | Status: SHIPPED | OUTPATIENT
Start: 2020-09-17 | End: 2021-09-17

## 2020-09-17 NOTE — PROGRESS NOTES
"Chief complaint: Postpartum visit    SUBJECTIVE:   Pt is a 20 y.o.  now s/p repeat low transverse  section approximately 1 week ago.. Pt denies fever, abdominal pain, n/v/d/c, VB, Pt currently breast feeding and taking PNV, ambulating without difficulty, urinating and stooling without complaints and tolerating normal diet.  Patient is having some mild depression at this time but is coping well, no SI or HI currently. Desires OCP  for contraception.  OBJECTIVE:  /62   Ht 149.9 cm (59\")   Wt 61.7 kg (136 lb)   LMP  (LMP Unknown)   BMI 27.47 kg/m²     Review of Systems   Constitutional: Negative for chills, fatigue and fever.   HENT: Negative for sore throat.    Eyes: Negative for visual disturbance.   Respiratory: Negative for cough, shortness of breath and wheezing.    Cardiovascular: Negative for chest pain, palpitations and leg swelling.   Gastrointestinal: Negative for abdominal pain, diarrhea, nausea and vomiting.   Genitourinary: Negative for dysuria, flank pain, frequency, vaginal bleeding, vaginal discharge and vaginal pain.   Neurological: Negative for syncope, light-headedness and headaches.   Psychiatric/Behavioral: Negative for dysphoric mood and suicidal ideas. The patient is not nervous/anxious.        Physical Exam  Vitals signs reviewed. Exam conducted with a chaperone present.   Constitutional:       General: She is not in acute distress.     Appearance: Normal appearance. She is normal weight. She is not ill-appearing, toxic-appearing or diaphoretic.   Abdominal:      General: Abdomen is flat. There is no distension.      Palpations: Abdomen is soft.      Tenderness: There is no abdominal tenderness. There is no guarding.      Comments: Appropriately healing Pfannenstiel incision dressing removed without difficulty no erythema or evidence of infection.   Musculoskeletal: Normal range of motion.   Skin:     General: Skin is warm and dry.   Neurological:      General: No focal " deficit present.      Mental Status: She is alert and oriented to person, place, and time.   Psychiatric:         Mood and Affect: Mood normal.         Behavior: Behavior normal.         Thought Content: Thought content normal.         Judgment: Judgment normal.         ASSESSMENT:    Pt is a 20 y.o.  s/p  doing well and healing appropriately.  Patient to follow-up in 4 weeks.  Will start on Micronor for contraception we will also refill pain medications as patient is still having some breakthrough pain and  was only 1 week ago.  PLAN:   1. OCP for contraception  2. Pt to continue to increase exercise/daily activities as tolerated   3. Continue prenatal vitamins   4. f/u 4 weeks    Med reconciliation completed.        This document has been electronically signed by Juan Chen DO on 2020 13:54 CDT

## 2020-09-21 ENCOUNTER — TELEPHONE (OUTPATIENT)
Dept: LACTATION | Facility: HOSPITAL | Age: 20
End: 2020-09-21

## 2023-08-11 NOTE — ANESTHESIA PREPROCEDURE EVALUATION
Anesthesia Evaluation     Patient summary reviewed and Nursing notes reviewed   NPO Solid Status: > 8 hours  NPO Liquid Status: > 2 hours     Airway   Mallampati: II  TM distance: >3 FB  Neck ROM: full  no difficulty expected  Dental - normal exam     Pulmonary - normal exam   (+) asthma, recent URI resolved,   Cardiovascular - normal exam    (+) valvular problems/murmurs murmur,       Neuro/Psych- negative ROS  GI/Hepatic/Renal/Endo - negative ROS     Musculoskeletal (-) negative ROS    Abdominal  - normal exam   Substance History   (+) drug use (THC)     OB/GYN    (+) Pregnant,         Other - negative ROS                                       Anesthesia Plan    ASA 2     epidural     Anesthetic plan and risks discussed with patient.      
[de-identified] : Patient has not tenderness over the patella.  Full range of motion.  No swelling or ecchymosis noted.  Calf is soft and nontender

## 2023-08-31 ENCOUNTER — LAB (OUTPATIENT)
Dept: LAB | Facility: HOSPITAL | Age: 23
End: 2023-08-31
Payer: MEDICAID

## 2023-08-31 ENCOUNTER — INITIAL PRENATAL (OUTPATIENT)
Dept: OBSTETRICS AND GYNECOLOGY | Facility: CLINIC | Age: 23
End: 2023-08-31
Payer: MEDICAID

## 2023-08-31 VITALS — BODY MASS INDEX: 23.83 KG/M2 | DIASTOLIC BLOOD PRESSURE: 58 MMHG | SYSTOLIC BLOOD PRESSURE: 102 MMHG | WEIGHT: 118 LBS

## 2023-08-31 DIAGNOSIS — Z32.01 POSITIVE PREGNANCY TEST: ICD-10-CM

## 2023-08-31 DIAGNOSIS — F17.200 SMOKER: ICD-10-CM

## 2023-08-31 DIAGNOSIS — O36.80X0 ENCOUNTER TO DETERMINE FETAL VIABILITY OF PREGNANCY, SINGLE OR UNSPECIFIED FETUS: ICD-10-CM

## 2023-08-31 DIAGNOSIS — Z98.891 HISTORY OF C-SECTION: ICD-10-CM

## 2023-08-31 DIAGNOSIS — Z34.80 PRENATAL CARE OF MULTIGRAVIDA, ANTEPARTUM: Primary | ICD-10-CM

## 2023-08-31 DIAGNOSIS — F12.91 HISTORY OF MARIJUANA USE: ICD-10-CM

## 2023-08-31 DIAGNOSIS — Z32.00 PREGNANCY EXAMINATION OR TEST, PREGNANCY UNCONFIRMED: ICD-10-CM

## 2023-08-31 LAB
ABO GROUP BLD: NORMAL
AMPHET+METHAMPHET UR QL: NEGATIVE
AMPHETAMINES UR QL: NEGATIVE
B-HCG UR QL: POSITIVE
BARBITURATES UR QL SCN: NEGATIVE
BASOPHILS # BLD AUTO: 0.02 10*3/MM3 (ref 0–0.2)
BASOPHILS NFR BLD AUTO: 0.3 % (ref 0–1.5)
BENZODIAZ UR QL SCN: NEGATIVE
BILIRUB UR QL STRIP: NEGATIVE
BLD GP AB SCN SERPL QL: NEGATIVE
BUPRENORPHINE SERPL-MCNC: NEGATIVE NG/ML
CANNABINOIDS SERPL QL: POSITIVE
CLARITY UR: ABNORMAL
COCAINE UR QL: NEGATIVE
COLOR UR: YELLOW
DEPRECATED RDW RBC AUTO: 39.4 FL (ref 37–54)
EOSINOPHIL # BLD AUTO: 0.21 10*3/MM3 (ref 0–0.4)
EOSINOPHIL NFR BLD AUTO: 3 % (ref 0.3–6.2)
ERYTHROCYTE [DISTWIDTH] IN BLOOD BY AUTOMATED COUNT: 11.9 % (ref 12.3–15.4)
EXPIRATION DATE: ABNORMAL
FENTANYL UR-MCNC: NEGATIVE NG/ML
GLUCOSE UR STRIP-MCNC: NEGATIVE MG/DL
HBV SURFACE AG SERPL QL IA: NORMAL
HCG INTACT+B SERPL-ACNC: NORMAL MIU/ML
HCT VFR BLD AUTO: 32.8 % (ref 34–46.6)
HCV AB SER DONR QL: NORMAL
HGB BLD-MCNC: 11.3 G/DL (ref 12–15.9)
HGB UR QL STRIP.AUTO: NEGATIVE
HIV 1+2 AB+HIV1 P24 AG SERPL QL IA: NORMAL
IMM GRANULOCYTES # BLD AUTO: 0.02 10*3/MM3 (ref 0–0.05)
IMM GRANULOCYTES NFR BLD AUTO: 0.3 % (ref 0–0.5)
INTERNAL NEGATIVE CONTROL: NEGATIVE
INTERNAL POSITIVE CONTROL: POSITIVE
KETONES UR QL STRIP: NEGATIVE
LEUKOCYTE ESTERASE UR QL STRIP.AUTO: ABNORMAL
LYMPHOCYTES # BLD AUTO: 1.56 10*3/MM3 (ref 0.7–3.1)
LYMPHOCYTES NFR BLD AUTO: 22.1 % (ref 19.6–45.3)
Lab: ABNORMAL
Lab: NORMAL
MCH RBC QN AUTO: 31.4 PG (ref 26.6–33)
MCHC RBC AUTO-ENTMCNC: 34.5 G/DL (ref 31.5–35.7)
MCV RBC AUTO: 91.1 FL (ref 79–97)
METHADONE UR QL SCN: NEGATIVE
MONOCYTES # BLD AUTO: 0.51 10*3/MM3 (ref 0.1–0.9)
MONOCYTES NFR BLD AUTO: 7.2 % (ref 5–12)
NEUTROPHILS NFR BLD AUTO: 4.74 10*3/MM3 (ref 1.7–7)
NEUTROPHILS NFR BLD AUTO: 67.1 % (ref 42.7–76)
NITRITE UR QL STRIP: POSITIVE
NRBC BLD AUTO-RTO: 0 /100 WBC (ref 0–0.2)
OPIATES UR QL: NEGATIVE
OXYCODONE UR QL SCN: NEGATIVE
PCP UR QL SCN: NEGATIVE
PH UR STRIP.AUTO: 6.5 [PH] (ref 5–8)
PLATELET # BLD AUTO: 307 10*3/MM3 (ref 140–450)
PMV BLD AUTO: 9.8 FL (ref 6–12)
PROPOXYPH UR QL: NEGATIVE
PROT UR QL STRIP: ABNORMAL
RBC # BLD AUTO: 3.6 10*6/MM3 (ref 3.77–5.28)
RH BLD: POSITIVE
RPR SER QL: NORMAL
SP GR UR STRIP: 1.01 (ref 1–1.03)
TRICYCLICS UR QL SCN: NEGATIVE
UROBILINOGEN UR QL STRIP: ABNORMAL
WBC NRBC COR # BLD: 7.06 10*3/MM3 (ref 3.4–10.8)

## 2023-08-31 PROCEDURE — 87661 TRICHOMONAS VAGINALIS AMPLIF: CPT | Performed by: NURSE PRACTITIONER

## 2023-08-31 PROCEDURE — 87086 URINE CULTURE/COLONY COUNT: CPT | Performed by: NURSE PRACTITIONER

## 2023-08-31 PROCEDURE — 87186 SC STD MICRODIL/AGAR DIL: CPT | Performed by: NURSE PRACTITIONER

## 2023-08-31 PROCEDURE — 80307 DRUG TEST PRSMV CHEM ANLYZR: CPT | Performed by: NURSE PRACTITIONER

## 2023-08-31 PROCEDURE — 86803 HEPATITIS C AB TEST: CPT | Performed by: NURSE PRACTITIONER

## 2023-08-31 PROCEDURE — 81003 URINALYSIS AUTO W/O SCOPE: CPT | Performed by: NURSE PRACTITIONER

## 2023-08-31 PROCEDURE — G0480 DRUG TEST DEF 1-7 CLASSES: HCPCS | Performed by: NURSE PRACTITIONER

## 2023-08-31 PROCEDURE — 87591 N.GONORRHOEAE DNA AMP PROB: CPT | Performed by: NURSE PRACTITIONER

## 2023-08-31 PROCEDURE — 80081 OBSTETRIC PANEL INC HIV TSTG: CPT | Performed by: NURSE PRACTITIONER

## 2023-08-31 PROCEDURE — 84702 CHORIONIC GONADOTROPIN TEST: CPT | Performed by: NURSE PRACTITIONER

## 2023-08-31 PROCEDURE — 87491 CHLMYD TRACH DNA AMP PROBE: CPT | Performed by: NURSE PRACTITIONER

## 2023-08-31 PROCEDURE — 36415 COLL VENOUS BLD VENIPUNCTURE: CPT

## 2023-08-31 RX ORDER — PRENATAL VIT/IRON FUM/FOLIC AC 27MG-0.8MG
TABLET ORAL DAILY
COMMUNITY

## 2023-08-31 NOTE — PROGRESS NOTES
I spent approximately 40 minutes with the patient acquiring the health and history intake, reviewing prior records, discussing topics related to healthy pregnancy, entering orders, and documentation. Her LMP is approximately 7/10/23. She tells me that she has been to [facility]; however, she forgot her proof of pregnancy today. She had a positive UPT in the office today. This is her 3rd pregnancy. She had a csection on 12/21/17 at 40 weeks gestation due to fetal intolerance to labor. She had a repeat csection on 9/8/20 at 39 weeks and 1 day gestation. She had Pyelonephritis during this pregnancy. She also said she had to have iron infusions during this pregnancy. She is interested in a tubal ligation.   A newob bag is given. The 1st trimester teaching was done with the patient. We discussed a healthy diet and exercise and what is recommended. I also discussed Listeriosis and Toxoplasmosis and what fish to avoid due to high mercury levels. I informed patient not to be in hot tubs, saunas, or tanning beds. We discussed that spotting may occur after intercourse which is common, but if heavy bleeding like a period occurs to [facility] hospital if clinic is closed.  I encouraged her to make an appointment with the dentist if she has not had a dental exam and cleaning in the last 6 months.  I instructed the patient that alcohol, illicit drug use, and tobacco smoking are not recommended in pregnancy. She is still vaping but plans to quit. She was doing Delta 8, but she tells me that she has stopped. She plans to breastfeed. I encouraged the patient to get the TDAP vaccine in the 3rd trimester.  I discussed with the patient that a pediatrician needs to be chosen prior to delivery for the infant to have an appointment scheduled before leaving the hospital.  I discussed lab tests will be done today. Her last pap smear was ASCUS with HPV positive on 2/17/20. All questions were answered at  this time. She is scheduled for an ultrasound and to see Fransisca VERDUZCO on 9/18/23.

## 2023-09-01 LAB
C TRACH RRNA CVX QL NAA+PROBE: NEGATIVE
N GONORRHOEA RRNA SPEC QL NAA+PROBE: NEGATIVE
RUBV IGG SERPL IA-ACNC: 2.3 INDEX
TRICHOMONAS VAGINALIS PCR: NEGATIVE

## 2023-09-02 LAB — BACTERIA SPEC AEROBE CULT: ABNORMAL

## 2023-09-05 RX ORDER — NITROFURANTOIN 25; 75 MG/1; MG/1
100 CAPSULE ORAL 2 TIMES DAILY
Qty: 14 CAPSULE | Refills: 0 | Status: SHIPPED | OUTPATIENT
Start: 2023-09-05

## 2023-09-08 LAB
CANNABINOIDS UR QL CFM: NORMAL
CARBOXYTHC/CREAT UR: 50 NG/MG CREAT
LEVEL OF DETECTION:: NORMAL

## 2023-09-14 DIAGNOSIS — F12.91 HISTORY OF MARIJUANA USE: ICD-10-CM

## 2023-09-14 DIAGNOSIS — Z32.01 POSITIVE PREGNANCY TEST: ICD-10-CM

## 2023-09-14 DIAGNOSIS — F17.200 SMOKER: ICD-10-CM

## 2023-09-14 DIAGNOSIS — O36.80X0 ENCOUNTER TO DETERMINE FETAL VIABILITY OF PREGNANCY, SINGLE OR UNSPECIFIED FETUS: ICD-10-CM

## 2023-09-14 DIAGNOSIS — Z98.891 HISTORY OF C-SECTION: ICD-10-CM

## 2023-09-18 ENCOUNTER — LAB (OUTPATIENT)
Dept: LAB | Facility: HOSPITAL | Age: 23
End: 2023-09-18
Payer: MEDICAID

## 2023-09-18 ENCOUNTER — INITIAL PRENATAL (OUTPATIENT)
Dept: OBSTETRICS AND GYNECOLOGY | Facility: CLINIC | Age: 23
End: 2023-09-18
Payer: MEDICAID

## 2023-09-18 VITALS — DIASTOLIC BLOOD PRESSURE: 60 MMHG | SYSTOLIC BLOOD PRESSURE: 100 MMHG | BODY MASS INDEX: 23.23 KG/M2 | WEIGHT: 115 LBS

## 2023-09-18 DIAGNOSIS — Z30.09 UNWANTED FERTILITY: ICD-10-CM

## 2023-09-18 DIAGNOSIS — O23.41 URINARY TRACT INFECTION IN MOTHER DURING FIRST TRIMESTER OF PREGNANCY: ICD-10-CM

## 2023-09-18 DIAGNOSIS — Z72.89 ENGAGES IN VAPING: ICD-10-CM

## 2023-09-18 DIAGNOSIS — Z12.4 ENCOUNTER FOR PAPANICOLAOU SMEAR FOR CERVICAL CANCER SCREENING: ICD-10-CM

## 2023-09-18 DIAGNOSIS — R82.5 POSITIVE URINE DRUG SCREEN: ICD-10-CM

## 2023-09-18 DIAGNOSIS — Z34.91 INITIAL OBSTETRIC VISIT IN FIRST TRIMESTER: Primary | ICD-10-CM

## 2023-09-18 DIAGNOSIS — Z13.79 GENETIC TESTING: ICD-10-CM

## 2023-09-18 DIAGNOSIS — Z98.891 HISTORY OF 2 CESAREAN SECTIONS: ICD-10-CM

## 2023-09-18 PROBLEM — O99.322 DRUG USE AFFECTING PREGNANCY IN SECOND TRIMESTER: Status: RESOLVED | Noted: 2020-03-02 | Resolved: 2023-09-18

## 2023-09-18 PROBLEM — Z34.90 NORMAL PREGNANCY: Status: RESOLVED | Noted: 2020-09-08 | Resolved: 2023-09-18

## 2023-09-18 PROBLEM — O34.211 ENCOUNTER FOR MATERNAL CARE FOR LOW TRANSVERSE SCAR FROM PREVIOUS CESAREAN DELIVERY: Status: RESOLVED | Noted: 2020-02-17 | Resolved: 2023-09-18

## 2023-09-18 PROBLEM — F12.11: Status: RESOLVED | Noted: 2020-03-30 | Resolved: 2023-09-18

## 2023-09-18 PROBLEM — O99.012 ANEMIA IN PREGNANCY, SECOND TRIMESTER: Status: RESOLVED | Noted: 2020-03-02 | Resolved: 2023-09-18

## 2023-09-18 PROBLEM — O23.42 URINARY TRACT INFECTION IN MOTHER DURING SECOND TRIMESTER OF PREGNANCY: Status: RESOLVED | Noted: 2020-05-28 | Resolved: 2023-09-18

## 2023-09-18 PROBLEM — N13.30 HYDRONEPHROSIS DETERMINED BY ULTRASOUND: Status: RESOLVED | Noted: 2020-05-28 | Resolved: 2023-09-18

## 2023-09-18 PROBLEM — D50.9 IRON DEFICIENCY ANEMIA: Status: RESOLVED | Noted: 2020-05-31 | Resolved: 2023-09-18

## 2023-09-18 PROBLEM — Z3A.38 38 WEEKS GESTATION OF PREGNANCY: Status: RESOLVED | Noted: 2020-08-28 | Resolved: 2023-09-18

## 2023-09-18 PROBLEM — Z34.82 SUPERVISION OF NORMAL INTRAUTERINE PREGNANCY IN MULTIGRAVIDA IN SECOND TRIMESTER: Status: RESOLVED | Noted: 2020-02-17 | Resolved: 2023-09-18

## 2023-09-18 LAB
BACTERIA UR QL AUTO: ABNORMAL /HPF
BILIRUB UR QL STRIP: NEGATIVE
CLARITY UR: ABNORMAL
COLOR UR: YELLOW
GLUCOSE UR STRIP-MCNC: NEGATIVE MG/DL
HGB UR QL STRIP.AUTO: NEGATIVE
HYALINE CASTS UR QL AUTO: ABNORMAL /LPF
KETONES UR QL STRIP: NEGATIVE
LEUKOCYTE ESTERASE UR QL STRIP.AUTO: ABNORMAL
NITRITE UR QL STRIP: NEGATIVE
PH UR STRIP.AUTO: 6.5 [PH] (ref 5–8)
PROT UR QL STRIP: ABNORMAL
RBC # UR STRIP: ABNORMAL /HPF
REF LAB TEST METHOD: ABNORMAL
SP GR UR STRIP: 1.01 (ref 1–1.03)
SQUAMOUS #/AREA URNS HPF: ABNORMAL /HPF
UROBILINOGEN UR QL STRIP: ABNORMAL
WBC # UR STRIP: ABNORMAL /HPF

## 2023-09-18 PROCEDURE — 87077 CULTURE AEROBIC IDENTIFY: CPT | Performed by: NURSE PRACTITIONER

## 2023-09-18 PROCEDURE — 87086 URINE CULTURE/COLONY COUNT: CPT | Performed by: NURSE PRACTITIONER

## 2023-09-18 PROCEDURE — 99204 OFFICE O/P NEW MOD 45 MIN: CPT | Performed by: NURSE PRACTITIONER

## 2023-09-18 PROCEDURE — 87186 SC STD MICRODIL/AGAR DIL: CPT | Performed by: NURSE PRACTITIONER

## 2023-09-18 PROCEDURE — 81001 URINALYSIS AUTO W/SCOPE: CPT | Performed by: NURSE PRACTITIONER

## 2023-09-18 NOTE — PROGRESS NOTES
[facility]  Obstetrics  Date of Service: 2023    CHIEF COMPLAINT:  New prenatal visit    HISTORY OF PRESENT ILLNESS:  Carol Guzmán is a 23 y.o. y/o  at 11w5d by LMP (Patient's last menstrual period was 07/10/2023 (approximate).).  This was a unplanned pregnancy and the patient is supported by FOB. Denies nausea with vomiting.  Denies breast tenderness.  She denies any vaginal bleeding.  She has  started taking a prenatal vitamin.    REVIEW OF SYSTEMS  Review of Systems   Constitutional:  Positive for fatigue. Negative for chills, fever, unexpected weight gain and unexpected weight loss.   Respiratory:  Negative for shortness of breath.    Cardiovascular:  Negative for chest pain and palpitations.   Gastrointestinal:  Negative for abdominal pain, constipation, diarrhea, nausea and vomiting.   Genitourinary:  Negative for breast discharge, breast lump, breast pain, difficulty urinating, dysuria, frequency, urinary incontinence, vaginal bleeding, vaginal discharge and vaginal pain.   Skin:  Negative for rash.   Neurological:  Negative for weakness and headache.   Psychiatric/Behavioral:  Negative for sleep disturbance, depressed mood and stress.      PRENATAL RISK FACTORS   Problems (from 22 to present)       Problem Noted Resolved    Unwanted fertility 2023 by Fransisca Francois APRN No    Overview Signed 2023 12:02 PM by Fransisca Francois APRN     Desires tubal sterilization         Urinary tract infection in mother during first trimester of pregnancy 2023 by Fransisca Francois APRN No    Overview Addendum 2023 12:10 PM by Fransisca Francois APRN     Hx of pyelonephritis in pregnancy in 2020.  UTI @NOB, repeat UA/culture            Engages in vaping 2023 by Fransisca Francois APRN No    Overview Signed 2023 12:12 PM by Fransisca Francois APRN     Recommend cessation.             Positive urine drug screen 2023 by  Fransisca Francois APRN No    Overview Signed 2023 12:13 PM by Fransisca Francois APRN     +THC. Used Delta 8.  Recommend cessation in pregnancy.          History of 2  sections 2023 by Fransisca Francois APRN No            DATING CRITERIA:  LMP (7/10/2023) -- MIKE 4/15/2024  1TUS ( 2023 at 10w5d) -- FINAL MIKE 4/3/2024    OBSTETRIC HISTORY:  OB History    Para Term  AB Living   3 2 2     2   SAB IAB Ectopic Molar Multiple Live Births           0 2      # Outcome Date GA Lbr Patricio/2nd Weight Sex Delivery Anes PTL Lv   3 Current            2 Term 20 39w1d  3033 g (6 lb 11 oz) F CS-LTranv Spinal N FRAN   1 Term 17 40w0d  2760 g (6 lb 1.4 oz) F CS-LTranv EPI N FRAN      Complications: Fetal Intolerance     GYN HISTORY:  Denies h/o sexually transmitted infections/pelvic inflammatory disease  Denies h/o abnormal pap smears  Last pap smear:   ASCUS, positive HPV  Last Completed Pap Smear       This patient has no relevant Health Maintenance data.          Denies h/o gynecologic surgeries, including biopsies of the cervix    PAST MEDICAL HISTORY:  Past Medical History:   Diagnosis Date    Abnormal Pap smear of cervix     Acute pharyngitis, unspecified     Allergic     Allergic rhinitis     Anemia in pregnancy, second trimester 2020    Anxiety     Asthma     Common cold     Cough     Heart murmur     Heart murmur     History of ear infections     Mild tetrahydrocannabinol (THC) abuse in early remission 2020    Otitis media     Serous      Pyelonephritis     Upper respiratory infection     Urinary tract infection      PAST SURGICAL HISTORY:  Past Surgical History:   Procedure Laterality Date     SECTION N/A 2017    Procedure:  SECTION PRIMARY;  Surgeon: Bird Munson MD;  Location:  [facility]IV[facility];  Service:      SECTION N/A 2020    Procedure:  SECTION REPEAT;  Surgeon: Juan Chen DO;  Location:   [facility];  Service: Obstetrics;  Laterality: N/A;     FAMILY HISTORY:  Family History   Problem Relation Age of Onset    No Known Problems Mother     No Known Problems Father     No Known Problems Sister     No Known Problems Brother     No Known Problems Daughter     No Known Problems Daughter     Colon cancer Maternal Grandmother     Suicidality Maternal Grandfather     Hypertension Paternal Grandmother     Hypertension Paternal Grandfather      SOCIAL HISTORY:  Social History     Socioeconomic History    Marital status: Single   Tobacco Use    Smoking status: Never    Smokeless tobacco: Never   Vaping Use    Vaping Use: Every day   Substance and Sexual Activity    Alcohol use: Not Currently    Drug use: Not Currently     Types: Marijuana     Comment: hx Delta 8 also    Sexual activity: Yes     Partners: Male     Birth control/protection: None     Comment: last pap smear ASCUS, HPV positive on 2/17/20     GENETIC SCREENING:  Age >36 yo as of MIKE: No  Thalassemia: No  NTD: No  CHD: No  Down Syndrome/MR/Fragile X/Autism: No  Ashkenazi Catholic with Tha-Sachs, [place], familial dysautonomia: No  Sickle cell disease or trait: No  Hemophilia: No  Muscular dystrophy: No  Cystic fibrosis: No  [place]'s chorea: No  Birth defects: No  Genetic/chromosomal disorders: No    INFECTION HISTORY:  TB exposure: No  HSV: No  Illness since LMP: No  Prior GBS infected child: No  STIs: No    ALLERGIES:  Allergies   Allergen Reactions    Percocet [Oxycodone-Acetaminophen] Hives       MEDICATIONS:  Prior to Admission medications    Medication Sig Start Date End Date Taking? Authorizing Provider   Prenatal Vit-Fe Fumarate-FA (prenatal vitamin 27-0.8) 27-0.8 MG tablet tablet Take  by mouth Daily.   Yes Provider, MD Ortega   nitrofurantoin, macrocrystal-monohydrate, (Macrobid) 100 MG capsule Take 1 capsule by mouth 2 (Two) Times a Day.  Patient not taking: Reported on 9/18/2023 9/5/23   Ceci Oliva APRN        PHYSICAL EXAM:   /60   Wt 52.2 kg (115 lb)   LMP 07/10/2023 (Approximate)   BMI 23.23 kg/m²   General: Alert, healthy, no distress, well nourished and well developed.  Neurologic: Alert, oriented to person, place, and time.  Gait normal.  Cranial nerves II-XII grossly intact.  HEENT: Normocephalic, atraumatic.  Extraocular muscles intact, pupils equal and reactive x2.    Teeth: Normal hygiene.  Neck: Supple, no adenopathy, thyroid normal size, non-tender, without nodularity, trachea midline.  Lungs: Normal respiratory effort.  Clear to auscultation bilaterally.  No wheezes, rhonci, or rales.  Heart: Regular rate and rhythm.  No murmer, rub or gallop.  Abdomen: Soft, non-tender, non-distended,no masses, no hepatosplenomegaly, no hernia.  Skin: No rash, no lesions.  Extremities: No cyanosis, clubbing or edema.  PELVIC EXAM:  External Genitalia/Vulva: Anatomy is normal, no significant redness of labia, no discharge on vulvar tissues, [place]'s and [place]th[place]'s glands are normal, no ulcers, no condylomatous lesions.  Urethra: Normal, no lesions.  Vagina: Vaginal tissues are not inflamed, normal color and texture, no significant discharge present.  Cervix: Normal in appearance without lesions or purulent discharge, no cervical motion tenderness.  Uterus: Deferred  Adnexa: Deferred  Pap test collected.     Bedside ultrasound performed by myself which shows the findings below: single IUP with cardiac activity visualized      IMPRESSION:  Carol Guzmán is a 23 y.o.  at 11w5d for a new prenatal visit.    PLAN:  1.  IUP at 11w5d  - Options counseling performed and patient desires continuation of pregnancy to term   - Prenatal labs reviewed  - Genetic testing, including cystic fibrosis, was discussed and patient desires NIPT and carrier screening today  - Continue prenatal vitamins  - Weight gain counseling performed.   - [place] BMI 18.5-24.9: Recommend 25-35 lb  - Return to clinic in 4 weeks for  return prenatal visit     Diagnosis Plan   1. Initial obstetric visit in first trimester        2. Urinary tract infection in mother during first trimester of pregnancy  Urine Culture - Urine, Urine, Clean Catch    Urinalysis With Microscopic - Urine, Clean Catch      3. Encounter for Papanicolaou smear for cervical cancer screening  LIQUID-BASED PAP SMEAR WITH HPV GENOTYPING IF ASCUS ([facility])      4. Genetic testing  [facility] Panorama Prenatal Test: Chromosomes 13, 18, 21, X & Y: Triploidy 22Q.11.2 Deletion - Blood,    [facility] Horizon 14 (Pan-Ethnic Standard) - Blood,      5. Unwanted fertility        6. Engages in vaping        7. Positive urine drug screen        8. History of 2  sections          Fransisca Francois, APRN  2023  12:13 CDT

## 2023-09-20 DIAGNOSIS — O23.42 URINARY TRACT INFECTION IN MOTHER DURING SECOND TRIMESTER OF PREGNANCY: Primary | ICD-10-CM

## 2023-09-20 LAB — BACTERIA SPEC AEROBE CULT: ABNORMAL

## 2023-09-20 RX ORDER — CEPHALEXIN 500 MG/1
500 CAPSULE ORAL 4 TIMES DAILY
Qty: 28 CAPSULE | Refills: 0 | Status: SHIPPED | OUTPATIENT
Start: 2023-09-20 | End: 2023-09-27

## 2023-09-22 LAB — REF LAB TEST METHOD: NORMAL

## 2023-09-26 ENCOUNTER — TELEPHONE (OUTPATIENT)
Dept: OBSTETRICS AND GYNECOLOGY | Facility: CLINIC | Age: 23
End: 2023-09-26
Payer: MEDICAID

## 2023-09-26 NOTE — TELEPHONE ENCOUNTER
Patient notified of low risk NIPT gender is male. Tha Sach is negative remainder of carrier screening is still processing

## 2023-09-28 ENCOUNTER — TELEPHONE (OUTPATIENT)
Dept: OBSTETRICS AND GYNECOLOGY | Facility: CLINIC | Age: 23
End: 2023-09-28
Payer: MEDICAID

## 2023-09-28 NOTE — TELEPHONE ENCOUNTER
----- Message from DAX Gonzalez sent at 9/28/2023 12:46 PM CDT -----  Pap is ASCUS, negative HPV. Repeat in 3 years per ASCCP guidelines. Let pt know and send letter.

## 2023-09-29 ENCOUNTER — REFERRAL TRIAGE (OUTPATIENT)
Dept: LABOR AND DELIVERY | Facility: HOSPITAL | Age: 23
End: 2023-09-29
Payer: MEDICAID

## 2025-05-14 ENCOUNTER — TRANSCRIBE ORDERS (OUTPATIENT)
Dept: ADMINISTRATIVE | Facility: HOSPITAL | Age: 25
End: 2025-05-14
Payer: MEDICAID

## 2025-05-14 DIAGNOSIS — R80.9 PROTEINURIA, UNSPECIFIED TYPE: ICD-10-CM

## 2025-05-14 DIAGNOSIS — N18.32 STAGE 3B CHRONIC KIDNEY DISEASE: Primary | ICD-10-CM

## 2025-05-28 RX ORDER — DAPAGLIFLOZIN 10 MG/1
10 TABLET, FILM COATED ORAL DAILY
COMMUNITY

## 2025-06-06 ENCOUNTER — HOSPITAL ENCOUNTER (OUTPATIENT)
Dept: CT IMAGING | Facility: HOSPITAL | Age: 25
Discharge: HOME | End: 2025-06-06
Payer: MEDICAID

## 2025-06-06 VITALS
HEART RATE: 69 BPM | OXYGEN SATURATION: 100 % | DIASTOLIC BLOOD PRESSURE: 57 MMHG | SYSTOLIC BLOOD PRESSURE: 111 MMHG | RESPIRATION RATE: 13 BRPM | BODY MASS INDEX: 29.45 KG/M2 | WEIGHT: 150 LBS | HEIGHT: 60 IN

## 2025-06-06 DIAGNOSIS — N18.32 STAGE 3B CHRONIC KIDNEY DISEASE: ICD-10-CM

## 2025-06-06 DIAGNOSIS — R80.9 PROTEINURIA, UNSPECIFIED TYPE: ICD-10-CM

## 2025-06-06 LAB
ANION GAP SERPL CALCULATED.3IONS-SCNC: 15.7 MMOL/L (ref 5–15)
APTT PPP: 29 SECONDS (ref 24.2–34.2)
BASOPHILS # BLD AUTO: 0.04 10*3/MM3 (ref 0–0.2)
BASOPHILS NFR BLD AUTO: 0.5 % (ref 0–1.5)
BUN SERPL-MCNC: 44.1 MG/DL (ref 6–20)
BUN/CREAT SERPL: 11.4 (ref 7–25)
CALCIUM SPEC-SCNC: 8.8 MG/DL (ref 8.6–10.5)
CHLORIDE SERPL-SCNC: 108 MMOL/L (ref 98–107)
CO2 SERPL-SCNC: 18.3 MMOL/L (ref 22–29)
CREAT SERPL-MCNC: 3.86 MG/DL (ref 0.57–1)
DEPRECATED RDW RBC AUTO: 45.1 FL (ref 37–54)
EGFRCR SERPLBLD CKD-EPI 2021: 15.8 ML/MIN/1.73
EOSINOPHIL # BLD AUTO: 0.46 10*3/MM3 (ref 0–0.4)
EOSINOPHIL NFR BLD AUTO: 5.7 % (ref 0.3–6.2)
ERYTHROCYTE [DISTWIDTH] IN BLOOD BY AUTOMATED COUNT: 13.4 % (ref 12.3–15.4)
GLUCOSE SERPL-MCNC: 96 MG/DL (ref 65–99)
HCT VFR BLD AUTO: 35.9 % (ref 34–46.6)
HGB BLD-MCNC: 11.5 G/DL (ref 12–15.9)
IMM GRANULOCYTES # BLD AUTO: 0.02 10*3/MM3 (ref 0–0.05)
IMM GRANULOCYTES NFR BLD AUTO: 0.2 % (ref 0–0.5)
INR PPP: 0.96 (ref 0.86–1.15)
LYMPHOCYTES # BLD AUTO: 1.61 10*3/MM3 (ref 0.7–3.1)
LYMPHOCYTES NFR BLD AUTO: 19.9 % (ref 19.6–45.3)
MCH RBC QN AUTO: 29.6 PG (ref 26.6–33)
MCHC RBC AUTO-ENTMCNC: 32 G/DL (ref 31.5–35.7)
MCV RBC AUTO: 92.3 FL (ref 79–97)
MONOCYTES # BLD AUTO: 0.59 10*3/MM3 (ref 0.1–0.9)
MONOCYTES NFR BLD AUTO: 7.3 % (ref 5–12)
NEUTROPHILS NFR BLD AUTO: 5.36 10*3/MM3 (ref 1.7–7)
NEUTROPHILS NFR BLD AUTO: 66.4 % (ref 42.7–76)
NRBC BLD AUTO-RTO: 0 /100 WBC (ref 0–0.2)
PLATELET # BLD AUTO: 294 10*3/MM3 (ref 140–450)
PMV BLD AUTO: 8.7 FL (ref 6–12)
POTASSIUM SERPL-SCNC: 4.4 MMOL/L (ref 3.5–5.2)
PROTHROMBIN TIME: 13.2 SECONDS (ref 11.8–14.9)
RBC # BLD AUTO: 3.89 10*6/MM3 (ref 3.77–5.28)
SODIUM SERPL-SCNC: 142 MMOL/L (ref 136–145)
WBC NRBC COR # BLD AUTO: 8.08 10*3/MM3 (ref 3.4–10.8)

## 2025-06-06 PROCEDURE — 85610 PROTHROMBIN TIME: CPT | Performed by: RADIOLOGY

## 2025-06-06 PROCEDURE — 25010000002 MIDAZOLAM PER 1MG: Performed by: RADIOLOGY

## 2025-06-06 PROCEDURE — 80048 BASIC METABOLIC PNL TOTAL CA: CPT | Performed by: RADIOLOGY

## 2025-06-06 PROCEDURE — 85730 THROMBOPLASTIN TIME PARTIAL: CPT | Performed by: RADIOLOGY

## 2025-06-06 PROCEDURE — 77012 CT SCAN FOR NEEDLE BIOPSY: CPT

## 2025-06-06 PROCEDURE — 25010000002 FENTANYL CITRATE (PF) 50 MCG/ML SOLUTION: Performed by: RADIOLOGY

## 2025-06-06 PROCEDURE — 25010000002 DESMOPRESSIN ACETATE PF 4 MCG/ML SOLUTION 1 ML AMPULE: Performed by: RADIOLOGY

## 2025-06-06 PROCEDURE — 85025 COMPLETE CBC W/AUTO DIFF WBC: CPT | Performed by: RADIOLOGY

## 2025-06-06 RX ORDER — FENTANYL CITRATE 50 UG/ML
INJECTION, SOLUTION INTRAMUSCULAR; INTRAVENOUS AS NEEDED
Status: COMPLETED | OUTPATIENT
Start: 2025-06-06 | End: 2025-06-06

## 2025-06-06 RX ORDER — LIDOCAINE HYDROCHLORIDE 20 MG/ML
INJECTION, SOLUTION INFILTRATION; PERINEURAL
Status: DISPENSED
Start: 2025-06-06 | End: 2025-06-06

## 2025-06-06 RX ORDER — MIDAZOLAM HYDROCHLORIDE 2 MG/2ML
INJECTION, SOLUTION INTRAMUSCULAR; INTRAVENOUS AS NEEDED
Status: COMPLETED | OUTPATIENT
Start: 2025-06-06 | End: 2025-06-06

## 2025-06-06 RX ADMIN — MIDAZOLAM HYDROCHLORIDE 2 MG: 1 INJECTION, SOLUTION INTRAMUSCULAR; INTRAVENOUS at 09:56

## 2025-06-06 RX ADMIN — FENTANYL CITRATE 25 MCG: 50 INJECTION, SOLUTION INTRAMUSCULAR; INTRAVENOUS at 10:02

## 2025-06-06 RX ADMIN — DESMOPRESSIN ACETATE 20 MCG: 4 INJECTION INTRAVENOUS; SUBCUTANEOUS at 09:40

## 2025-06-06 NOTE — H&P
[facility]   Interventional Radiology H&P    Patient Name: Carol Guzmán  : 2000  MRN: [id number]  Primary Care Physician:  Provider, No Known  Referring Physician: Kamlesh Huang MD  Date of admission: 2025    Subjective   Subjective     HPI:  Carol Guzmán is a 25 y.o. female with chronic kidney disease    Review of Systems:   Constitutional no fever,  no weight loss, anxious       Otolaryngeal no difficulty swallowing   Cardiovascular no chest pain   Pulmonary no cough, no sputum production   Gastrointestinal no constipation, no diarrhea                         Personal History       Past Medical/Surgical History:   Past Medical History:   Diagnosis Date    Abnormal Pap smear of cervix     Acute pharyngitis, unspecified     Allergic rhinitis     Anemia in pregnancy, second trimester 2020    Anxiety     Asthma     childhood asthma- resolved    Common cold     Cough     Heart murmur     History of ear infections     Mild tetrahydrocannabinol (THC) abuse in early remission 2020    Otitis media     Serous      Pyelonephritis     Upper respiratory infection     Urinary tract infection      Past Surgical History:   Procedure Laterality Date     SECTION N/A 2017    Procedure:  SECTION PRIMARY;  Surgeon: Bird Munson MD;  Location: [facility] LAB[facility] DELIVERY;  Service:      SECTION N/A 2020    Procedure:  SECTION REPEAT;  Surgeon: Juan Chen DO;  Location: Health system LABOR DELIVERY;  Service: Obstetrics;  Laterality: N/A;     SECTION N/A 2024    repeat       Social History:  reports that she has never smoked. She has never used smokeless tobacco. She reports that she does not currently use alcohol. She reports that she does not currently use drugs after having used the following drugs: Marijuana.    Medications:  (Not in a hospital admission)    Current medications:    Current IV drips:  No current  "facility-administered medications for this encounter.      Allergies:  Allergies   Allergen Reactions    Percocet [Oxycodone-Acetaminophen] Hives       Objective    Objective     Vitals:   Heart Rate:  [72] 72  Resp:  [17] 17  BP: (124)/(74) 124/74      Physical Exam:   Constitutional: Awake, alert, No acute distress    Respiratory: Clear to auscultation bilaterally, nonlabored respirations    Cardiovascular: RRR, no murmurs, rubs, or gallops,     ASA SCALE ASSESSMENT:  3-Severe systemic disease that results in functional limitation    MALLAMPATI CLASSIFICATION:  1-Able to visualize the soft palate, fauces, uvula, anterior & posterior tonsilar pillars.       Result Review        Result Review:     Sodium   Date Value Ref Range Status   06/06/2025 142 136 - 145 mmol/L Final       Potassium   Date Value Ref Range Status   06/06/2025 4.4 3.5 - 5.2 mmol/L Final       Chloride   Date Value Ref Range Status   06/06/2025 108 (H) 98 - 107 mmol/L Final       No results found for: \"PLASMABICARB\"    BUN   Date Value Ref Range Status   06/06/2025 44.1 (H) 6.0 - 20.0 mg/dL Final       Creatinine   Date Value Ref Range Status   06/06/2025 3.86 (H) 0.57 - 1.00 mg/dL Final       Calcium   Date Value Ref Range Status   06/06/2025 8.8 8.6 - 10.5 mg/dL Final           No components found for: \"GLUCOSE.*\"  Results from last 7 days   Lab Units 06/06/25  0757   WBC 10*3/mm3 8.08   HEMOGLOBIN g/dL 11.5*   HEMATOCRIT % 35.9   PLATELETS 10*3/mm3 294      Results from last 7 days   Lab Units 06/06/25  0757   INR  0.96           Assessment / Plan     Assesment:   Chronic kidney disease      Plan:   CT guided renal biopsy    The risks and benefits of the procedure were discussed with the patient.    Electronically signed by Blanco Byrne MD, 06/06/25, 9:03 AM EDT.    "

## 2025-06-06 NOTE — DISCHARGE INSTRUCTIONS
No submersion in water for one week . You may shower.   Watch site for any signs redness, edema, drainage, fever. If you notice this call your Dr. Return to er as needed.   No heavy lifting, strenuous activity for one week.   You cannot drive for 24 hours. You need someone to stay with you for 24 hours.

## 2025-06-09 NOTE — PROGRESS NOTES
Radiology Department Procedure Follow-up Call Note    Carol Guzmán  2000  06/09/25    Carol Guzmán was contacted via telephone to follow-up after a procedure performed by the radiologist.     Pain Management  What is your pain level today? None, absolutely no pain    Bleeding issues  Have you had any bleeding? Not at all, not even in my urine    Signs of infection  Have you had any signs of infection(including fever, chills, redness, drainage, rash)? no    Emergency Department Visit or Other Provider  Have returned to the ED or seen another provider related to this procedure? no    Satisfaction with Care  Are you satisfied with the care you received? Absolutely, 100%, my team was awesome    Other Comments/Questions: I had the best care, Dr Byrne, my 2 nurses, the CT tech, everyone was amazing.    Additional or Follow up Instructions:  none      Electronically signed by Amber Álvarez RN, 06/09/25, 12:22 PM EDT.

## 2025-06-10 LAB
LAB AP CASE REPORT: NORMAL
LAB AP CLINICAL INFORMATION: NORMAL
PATH REPORT.ADDENDUM SPEC: NORMAL
PATH REPORT.FINAL DX SPEC: NORMAL
PATH REPORT.GROSS SPEC: NORMAL

## (undated) DEVICE — SUT MNCRYL 3/0 Y936H

## (undated) DEVICE — DRSNG TELFA PAD NONADH STR 1S 3X4IN

## (undated) DEVICE — POOLE SUCTION INSTRUMENT WITH REMOVABLE SHEATH: Brand: POOLE

## (undated) DEVICE — UNDYED BRAIDED (POLYGLACTIN 910), SYNTHETIC ABSORBABLE SUTURE: Brand: COATED VICRYL

## (undated) DEVICE — GLV SURG SENSICARE W/ALOE PF LF 8.5 STRL

## (undated) DEVICE — SUT VICRYL 3-0 REEL 54IN J205G

## (undated) DEVICE — SUT PDS LP 0 CTX VIO 60IN Z990G

## (undated) DEVICE — PAD,ABDOMINAL,8"X10",ST,LF: Brand: MEDLINE

## (undated) DEVICE — SYS SKIN CLS DERMABOND PRINEO W/60CM MESH TP

## (undated) DEVICE — SUT GUT CHRM 1 CTX 36IN 905H

## (undated) DEVICE — GARMENT,MEDLINE,DVT,INT,CALF,MED, GEN2: Brand: MEDLINE

## (undated) DEVICE — SUT VIC 2/0 CT1 36IN

## (undated) DEVICE — SUT VIC 3/0 CTI 36IN J944H

## (undated) DEVICE — PK C/SECT 60

## (undated) DEVICE — SUT VIC 0 CTX 36IN J978H

## (undated) DEVICE — GLV SURG SENSICARE PI LF PF 8 GRN STRL

## (undated) DEVICE — GOWN,PREVENTION PLUS,XLNG/XXLARGE,STRL: Brand: MEDLINE

## (undated) DEVICE — SYS SKIN CLS DERMABOND PRINEO W/22CM MESH TP

## (undated) DEVICE — STERILE POLYISOPRENE POWDER-FREE SURGICAL GLOVES WITH EMOLLIENT COATING: Brand: PROTEXIS

## (undated) DEVICE — SUT GUT CHRM 2/0 SH 27IN G123H

## (undated) DEVICE — TRY SPINE PENCAN 24GA X4IN

## (undated) DEVICE — GLV SURG TRIUMPH LT PF LTX 8 STRL

## (undated) DEVICE — GLV SURG TRIUMPH ORTHO W/ALOE PF LTX 8 STRL

## (undated) DEVICE — Device

## (undated) DEVICE — SUT  GUT PLAIN 2/0 CT1 27IN 843H

## (undated) DEVICE — GLV SURG SENSICARE GREEN W/ALOE PF LF 6.5 STRL

## (undated) DEVICE — GLV SURG TRIUMPH ORTHO W/ALOE PF LTX 6.5 STRL